# Patient Record
Sex: FEMALE | Race: WHITE | NOT HISPANIC OR LATINO | Employment: FULL TIME | URBAN - METROPOLITAN AREA
[De-identification: names, ages, dates, MRNs, and addresses within clinical notes are randomized per-mention and may not be internally consistent; named-entity substitution may affect disease eponyms.]

---

## 2018-08-20 ENCOUNTER — OFFICE VISIT CONVERTED (OUTPATIENT)
Dept: PULMONOLOGY | Facility: CLINIC | Age: 53
End: 2018-08-20
Attending: INTERNAL MEDICINE

## 2021-05-28 VITALS
SYSTOLIC BLOOD PRESSURE: 124 MMHG | RESPIRATION RATE: 16 BRPM | WEIGHT: 199.12 LBS | HEIGHT: 64 IN | TEMPERATURE: 98.5 F | DIASTOLIC BLOOD PRESSURE: 82 MMHG | HEART RATE: 78 BPM | BODY MASS INDEX: 33.99 KG/M2 | OXYGEN SATURATION: 96 %

## 2021-05-28 NOTE — PROGRESS NOTES
Patient: MARY KATE BUSH     Acct: [id number]     Report: #[id number]  UNIT #: [id number]     : 1965    Encounter Date:2018  PRIMARY CARE: LUIS MANUEL BEGUM  ***Signed***  --------------------------------------------------------------------------------------------------------------------  Chief Complaint      Encounter Date      Aug 20, 2018            Primary Care Provider      LUIS MANUEL BEGUM            Referring Provider      LUIS MANUEL BEGUM            Patient Complaint      Patient is complaining of      COPD/new patient            VITALS      Height 5 ft 4 in / 162.56 cm      Weight 199 lbs 2 oz / 90.668553 kg      BSA 2.06 m2      BMI 34.2 kg/m2      Temperature 98.5 F / 36.94 C - Oral      Pulse 78      Respirations 16      Blood Pressure 124/82 Sitting, Right Arm      Pulse Oximetry 96%, room air      Exhaled Nitrous Oxide Testin            HPI      The patient is a 53 year old female with chronic heavy smoking history in the     past who recently traveled in 2018 to her dying sister in [place].  At that     time she was very sick, was having shortness of breath and was congested.  She     was seen in [place] by a physician who gave her three doses of antibiotic shots    and an IV steroid shot was given, two different antibiotics and oral steroids to    go home with.  She came back to [place], but continued to have symptoms, so she    was admitted to the hospital at [facility] on 2018.  She     was started on breathing treatments, IV steroids and antibiotics including     ceftriaxone.  This helped her and she was discharged after three days. At that     time, CT scan of the chest was done which she was told was shown to have lung     nodules.  When she was discharged she was discharged on Spiriva, Brovana and     Pulmicort, but currently she is taking ProAir and Advair. She rarely uses ProAir    and has not used for one month now. She is on Advair which she is not  using     every day, uses only when she has symptoms.  She has a history of recurrent     pneumonia every year, especially when the weather changes.  She has been a heavy    smoker of one pack per day for more than 30 years. She quit smoking three weeks     ago, currently is using nicotine patch.  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 Her father was    a heavy smoker and had the diagnosis of emphysema and  of throat cancer. She    has been told she has COPD, but never had PFTs.  She has no fever, chills,     nausea or vomiting. She has shortness of breath with activities.            ROS      Constitutional:  Complains of: Fatigue; Denies: Fever, Weight gain, Weight loss,    Chills, Insomnia, Other      Respiratory/Breathing:  Complains of: Shortness of air, Cough; Denies: Wheezing,    Hemoptysis, Pleuritic pain, Other      Endocrine:  Denies: Polydipsia, Polyuria, Heat/cold intolerance, Abnorml menstru    al pattern, Diabetes, Other      Eyes:  Denies: Blurred vision, Vision Changes, Other      Cardiovascular:  Denies: Chest Pain, Exertional dyspnea, Peripheral Edema,     Palpitations, Syncope, Wake up Gasping for air, Orthopnea, Tachycardia, Other      Gastrointestinal:  Denies: Abdominal pain/cramping, Bloody stools, Constipation,    Diarrhea, Melena, Nausea, Vomiting, Other      Genitourinary:  Denies: Dysuria, Urinary frequency, Incontinence, Hematuria,     Urgency, Other      Musculoskeletal:  Denies: Joint Pain, Joint Stiffness, Joint Swelling, Myalgias,    Other      Hematologic/lymphatic:  DENIES: Lymphadenopathy, Bruising, Bleeding tendencies,     Other      Neurologic:  Denies: Headache, Numbness, Weakness, Seizures, Other      Psychiatric:  Denies: Anxiety, Appropriate Effect, Depression, Other      Sleep:  No: Excessive daytime sleep, Morning Headache?, Snoring, Insomnia?, Stop    breathing at sleep?, Other      Integumentary:  Denies:  Rash, Dry skin, Skin Warm to Touch, Other            FAMILY/SOCIAL/MEDICAL HX      Surgical History:  Yes: Cholecystectomy, Tonsils; No: AAA Repair, Abdominal Opal    lea, Adenoids, Angioplasty, Appendectomy, Back Surgery, Bladder Surgery, Bowel     Surgery, Breast Surgery, CABG, Carotid Stenosis, Ear Surgery, Eye Surgery, Head     Surgery, Hernia Surgery, Kidney Surgery, Nose Surgery, Oral Surgery, Orthopedic     Surgery, Prostatectomy, Rectal Surgery, Spinal Surgery, Testicular Surgery,     Throat Surgery, Valve Replacement, Vascular Surgery, Other Surgeries      Stroke - Family Hx:  Sister, Grandparent      Heart - Family Hx:  Mother, Father      Diabetes - Family Hx:  Mother, Sister      Cancer/Type - Family Hx:  Father, Sister, Grandparent      Is Father Still Living?:  No      Is Mother Still Living?:  No       Family History:  Yes      Social History:  Tobacco Use; No Alcohol Use, No Recreational Drug use      Smoking status:  Current every day smoker (1 ppd 30 years )      Hysterectomy:  Yes      Anticoagulation Therapy:  No      Antibiotic Prophylaxis:  No      Medical History:  Yes: Asthma, Miscellaneous Medical/oth (acid reflux); No:     Alcoholism, Allergies, Anemia, Arthritis, Atrial Fibrillation, Blood Disease,     Broken Bones, Cataracts, Chemical Dependency, Chemotherapy/Cancer, Chronic     Bronchitis/COPD, Emphysema, Chronic Liver Disease, Colon Trouble, Colitis,     Diverticulitis, Congestive Heart Failu, Deafness or Ringing Ears, Convulsions,     Depression, Anxiety, Bipolar Disorder, PTSD, Diabetes, Epilepsy, Seizures,     Forgetfullness, Glaucoma, Gall Stones, Gout, Head Injury, Heart Attack, Heart     Murmur, GERD, Hemorrhoids/Rectal Prob, Hepatitis, Hiatal Hernia, High Blood     Pressure, High Cholesterol, HIV (Do not ask - volu, Jaundice, Kidney or Bladder     Disease, Kidney Stones, Migrane Headaches, Mitral Valve Prolapse, Night sweats,     Phlebitis, Psychiatric Care, Reflux Disease,  Rheumatic Fever, Sexually     Transmitted Dis, Shortness Of Breath, Sinus Trouble, Skin Disease/Psoriais/Ecz,     Stroke, Thyroid Problem, Tuberculosis or Pos TB Te      Psychiatric History      none            PREVENTION      Hx Influenza Vaccination:  Yes      Date Influenza Vaccine Given:  Aug 1, 2017      Influenza Vaccine Declined:  No      2 or More Falls Past Year?:  No      Fall Past Year with Injury?:  No      Hx Pneumococcal Vaccination:  No      Encouraged to follow-up with:  PCP regarding preventative exams.      Chart initiated by      DANIELLE HERNANDEZ/ MA            ALLERGIES/MEDICATIONS      Allergies:        Coded Allergies:             Codeine (Verified  Allergy, 8/20/18)                  dizzy, nausous      Medications    Last Reconciled on 8/20/18 15:27 by AVELINO GARCIA MD      Albuterol (Proair HFA*) 8.5 Gm Inh      1-2 PUFFS INH RTQ6H Y for SHORTNESS OF BREATH, #1 INH 0 Refills         Reported         8/20/18       Neb-Budesonide (Pulmicort) 0.25 Mg/2 Ml Ampul.neb      0.25 MG INH RTBID, NEB         Reported         8/20/18       Arformoterol Tartrate (Brovana) 15 Mcg/2 Ml Vial.neb      15 MCG INH RTBID, #60 NEB         Reported         8/20/18       Magnesium Oxide (Magnesium Oxide) 500 Mg Tablet      250 MG PO QDAY, TAB         Reported         8/20/18       Omeprazole (PriLOSEC*) 40 Mg Capsule.dr      40 MG PO QDAY for 30 Days, #30 CAP 0 Refills         Reported         8/20/18      Current Medications      Current Medications Reviewed 8/20/18            EXAM      CONSTITUTIONAL: Pleasant  female in no acute distress, normal conversant.       EYES : Pink conjunctive, no ptosis, PERRL.       ENMT : Nose and ears appear normal, normal dentition, mild posterior pharyngeal     wall erythema, no sinus tenderness. Mallampati classification 2.        Neck: Nontender, no masses, no thyromegaly, no nodules.      Resp : Bilateral diminished breath sounds, no wheezing, crackles or rhonchi.      Resonant  to percussion bilaterally.      CVS  : No carotid bruits, s1s2 nl, RRR, no murmur, rubs or gallop, no peripheral    edema       Chest wall: Normal rise with inspiration, nontender on palpation.      GI   : Abdomen soft, with no masses, no hepatosplenomegaly, no hernias, BS+      MSK  : Normal gait and station, no digital cyanosis or clubbing       Skin : No rashes, ulcerations or lesions, normal turgor and temperature      Neuro: CN II - XII intact, no sensory deficits, DTRs intact and symmetrical, no     motor weakness      Psych: Appropriate affect, A   Vitals      Vitals:             Height 5 ft 4 in / 162.56 cm           Weight 199 lbs 2 oz / 90.864186 kg           BSA 2.06 m2           BMI 34.2 kg/m2           Temperature 98.5 F / 36.94 C - Oral           Pulse 78           Respirations 16           Blood Pressure 124/82 Sitting, Right Arm           Pulse Oximetry 96%, room air            REVIEW      Results Reviewed      PCCS Results Reviewed?:  Yes Prev Lab Results, Yes Prev Radiology Results, Yes     Previous [facility] Records      Lab Results      The patient's primary care office visit note was reviewed.  We are calling for     records of CT scan of the chest done at [facility] on     05/14/2018.            Assessment      COPD (chronic obstructive pulmonary disease)         Centrilobular emphysema - J43.2         COPD type: emphysema         Emphysema type: centrilobular            Notes      New Medications      * Omeprazole (PriLOSEC*) 40 MG CAPSULE.DR: 40 MG PO QDAY 30 Days #30      * MAGNESIUM OXIDE (Magnesium Oxide) 500 MG TABLET: 250 MG PO QDAY      * ARFORMOTEROL TARTRATE (Brovana) 15 MCG/2 ML VIAL.NEB: 15 MCG INH RTBID #60         Instructions: DIAGNOSIS CODE REQUIRED PRIOR TO PRESCRIBING.      * Neb-Budesonide (Pulmicort) 0.25 MG/2 ML AMPUL.NEB: 0.25 MG INH RTBID         Instructions: DIAGNOSIS CODE REQUIRED PRIOR TO PRESCRIBING.      * ALBUTEROL (Proair HFA*) 8.5 GM INH: 1-2 PUFFS  INH RTQ6H PRN SHORTNESS OF       BREATH #1      * Umeclidinium/Vilanterol 62.5-25 Mcg Inh (Anoro Ellipta 62.5-25 Mcg Inh) 1 EACH      BLST.W.DEV: 1 PUFF INH QDAY #1      New Diagnostics      * PFT-Comp, PrePost,DLCO,BodyBox, Week         Dx: COPD (chronic obstructive pulmonary disease) - J44.9      * 6 Min Walk With Pulse Ox, Routine         Dx: COPD (chronic obstructive pulmonary disease) - J44.9      * Immunoglobulin  E (I, Week         Dx: COPD (chronic obstructive pulmonary disease) - J44.9      PLAN:   The patient is a 53 year old female with a chronic smoking history who     likely has COPD with recent COPD exacerbation.            1. COPD with recent COPD exacerbation.  I will check pulmonary function test,     six minute walk test and serum IgE level. I advised her to continue with ProAir     as needed, stop Advair, start Anoro once daily.              2.  I applauded her effort to quit smoking.  Counseling was done for more than     five minutes.  I advised her to continue with nicotine patch for now.              3. We will address vaccination and further choice of inhalers on next office     visit.            4. Apparently she has a history of lung nodules which was seen on CT scan of     05/2017.  If we do not get the records of that, after next office visit, I will     order a CT scan of the chest.            Patient Education      Education resources provided:  Yes      Patient Education Provided:  COPD            Patient Education:        Chronic Obstructive Pulmonary Disease                 Disclaimer: Converted document may not contain table formatting or lab diagrams. Please see [facility] [facility] for the authenticated document.

## 2022-04-14 ENCOUNTER — OFFICE VISIT (OUTPATIENT)
Dept: CARDIOLOGY | Facility: CLINIC | Age: 57
End: 2022-04-14

## 2022-04-14 VITALS
WEIGHT: 197 LBS | BODY MASS INDEX: 33.63 KG/M2 | HEIGHT: 64 IN | DIASTOLIC BLOOD PRESSURE: 69 MMHG | SYSTOLIC BLOOD PRESSURE: 132 MMHG | HEART RATE: 84 BPM

## 2022-04-14 DIAGNOSIS — I50.32 DIASTOLIC CHF, CHRONIC: ICD-10-CM

## 2022-04-14 DIAGNOSIS — R06.02 SHORTNESS OF BREATH: Primary | ICD-10-CM

## 2022-04-14 DIAGNOSIS — Z72.0 NICOTINE USE: ICD-10-CM

## 2022-04-14 PROCEDURE — 93000 ELECTROCARDIOGRAM COMPLETE: CPT | Performed by: SPECIALIST

## 2022-04-14 PROCEDURE — 99203 OFFICE O/P NEW LOW 30 MIN: CPT | Performed by: SPECIALIST

## 2022-04-14 RX ORDER — HYDROCHLOROTHIAZIDE 12.5 MG/1
12.5 TABLET ORAL DAILY
COMMUNITY
Start: 2022-03-23

## 2022-04-14 RX ORDER — CLOPIDOGREL BISULFATE 75 MG/1
75 TABLET ORAL DAILY
COMMUNITY
Start: 2022-03-23

## 2022-04-14 NOTE — PROGRESS NOTES
[facility]  Cardiology progress Note    Patient Name: Lisy Almeida  : 1965    CHIEF COMPLAINT  Shortness of breath      Subjective   Subjective     HISTORY OF PRESENT ILLNESS    Lisy Almeida is a 56 y.o. female with history of shortness of breath on exertion for several years.  Increased recently.  She has chronic COPD.  No chest pain.  No history of CHF on chest x-ray.    Review of Systems:   Constitutional no fever,  no weight loss   Skin no rash   Otolaryngeal no difficulty swallowing   Cardiovascular See HPI   Pulmonary no cough, no sputum production   Gastrointestinal no constipation, no diarrhea   Genitourinary no dysuria, no hematuria   Hematologic no easy bruisability, no abnormal bleeding   Musculoskeletal no muscle pain   Neurologic no dizziness, no falls         Personal History     Social History:  reports that she has been smoking. She has never used smokeless tobacco. She reports that she does not drink alcohol and does not use drugs.    Home Medications:  Current Outpatient Medications on File Prior to Visit   Medication Sig   • Albuterol Sulfate (PROAIR HFA IN) Inhale.   • Aspirin 81 MG capsule TAKE 1 TABLET BY MOUTH ONCE DAILY   • Budeson-Glycopyrrol-Formoterol (BREZTRI AEROSPHERE IN) Inhale.   • clopidogrel (PLAVIX) 75 MG tablet Take 75 mg by mouth Daily.   • hydroCHLOROthiazide (HYDRODIURIL) 12.5 MG tablet Take 12.5 mg by mouth Daily.   • Tiotropium Bromide Monohydrate (SPIRIVA HANDIHALER IN) Inhale.     No current facility-administered medications on file prior to visit.     Allergies:  Allergies   Allergen Reactions   • Codeine Shortness Of Breath       Objective    Objective       Vitals:   Heart Rate:  [84] 84  BP: (132)/(69) 132/69  Body mass index is 33.81 kg/m².     Physical Exam:   Constitutional: Awake, alert, No acute distress    Eyes: PERRLA, sclerae anicteric, no conjunctival injection   HENT: NCAT, mucous membranes moist   Neck: Supple, no thyromegaly, no  lymphadenopathy, trachea midline   Respiratory: Clear to auscultation bilaterally, nonlabored respirations    Cardiovascular: RRR, no murmurs or rubs. Palpable pedal pulses bilaterally   Musculoskeletal: No bilateral ankle edema, no cyanosis to extremities   Psychiatric: Appropriate affect, cooperative   Neurologic: Oriented x 3, strength symmetric in all extremities, Cranial Nerves grossly intact to confrontation, speech clear   Skin: No rashes.    Result Review    Result Review:  I have personally reviewed the available results from  [x]  Laboratory  [x]  EKG  [x]  Cardiology  [x]  Medications  [x]  Old records  []  Other:     ECG 12 Lead    Date/Time: 4/14/2022 12:41 PM  Performed by: Otto Stevens MD  Authorized by: Otto Stevens MD   Rhythm: sinus rhythm  Conduction: incomplete right bundle branch block  Other findings: non-specific ST-T wave changes    Clinical impression: abnormal EKG            Impression/Plan:  1.  Chronic diastolic heart failure/shortness of breath: Continue hydrochlorothiazide 12.5 mg once a day.  Increased to 25 mg once a day if still short of breath.  Echocardiogram to evaluate left ventricular systolic function.  Check BMP and BNP.  2.  COPD: Continue current bronchodilators.  3.  TIA: Continue Plavix 75 mg a day.  4.  Positive nicotine use: Smoking cessation discussed with patient.           Otto Stevens MD   04/14/22   12:27 EDT

## 2022-04-19 ENCOUNTER — TELEPHONE (OUTPATIENT)
Dept: CARDIOLOGY | Facility: CLINIC | Age: 57
End: 2022-04-19

## 2022-04-19 NOTE — TELEPHONE ENCOUNTER
----- Message from DEBORAH Rubi sent at 4/14/2022  3:35 PM EDT -----  Notify pt potassium level is low, start potassium 10 meq daily, recheck BMP in 2 weeks

## 2022-06-20 ENCOUNTER — TELEPHONE (OUTPATIENT)
Dept: CARDIOLOGY | Facility: CLINIC | Age: 57
End: 2022-06-20

## 2022-06-20 NOTE — TELEPHONE ENCOUNTER
----- Message from Otto Stevens MD sent at 6/20/2022  2:06 PM EDT -----  Notify pt echocardiogram shows normal heart function and no significant valve abnormality. Keep follow up as scheduled.

## 2022-07-28 ENCOUNTER — OFFICE VISIT (OUTPATIENT)
Dept: PULMONOLOGY | Facility: CLINIC | Age: 57
End: 2022-07-28

## 2022-07-28 VITALS
HEIGHT: 64 IN | TEMPERATURE: 98.2 F | OXYGEN SATURATION: 95 % | BODY MASS INDEX: 32.78 KG/M2 | WEIGHT: 192 LBS | HEART RATE: 88 BPM | RESPIRATION RATE: 19 BRPM | DIASTOLIC BLOOD PRESSURE: 83 MMHG | SYSTOLIC BLOOD PRESSURE: 130 MMHG

## 2022-07-28 DIAGNOSIS — J41.8 MIXED SIMPLE AND MUCOPURULENT CHRONIC BRONCHITIS: Primary | ICD-10-CM

## 2022-07-28 DIAGNOSIS — Z72.0 TOBACCO ABUSE: ICD-10-CM

## 2022-07-28 PROCEDURE — 99203 OFFICE O/P NEW LOW 30 MIN: CPT | Performed by: INTERNAL MEDICINE

## 2022-07-28 RX ORDER — SULFAMETHOXAZOLE AND TRIMETHOPRIM 800; 160 MG/1; MG/1
TABLET ORAL
COMMUNITY
Start: 2022-06-22

## 2022-07-28 RX ORDER — ROFLUMILAST 250 UG/1
TABLET ORAL DAILY
COMMUNITY

## 2022-07-28 RX ORDER — FEXOFENADINE HCL 180 MG/1
180 TABLET ORAL DAILY
COMMUNITY
Start: 2022-06-28

## 2022-07-28 NOTE — PROGRESS NOTES
Pulmonary Consultation    Gina Murcia*,    Thank you for asking me to see Lisy Almeida for   Chief Complaint   Patient presents with   • Establish Care     New Patient    • COPD   • Shortness of Breath   • Cough   • Wheezing   .      History of Present Illness  Lisy Almeida is a 56 y.o. female with a PMH significant for COPD and tobacco abuse presents for evaluation patient complains of dyspnea on exertion along with cough wheeze and mucopurulent sputum sputum off and on patient complains of tiredness and fatigue and reduced effort tolerance she denies any chest pain fever or hemoptysis patient continues to smoke but is trying to cut back       Tobacco use history:  Type: cigarettes  Amount: 0.5 ppd  Duration: 40 years  Cessation: n   Willing to quit: Yes      Review of Systems: History obtained from chart review and the patient.  Review of Systems   Respiratory: Positive for cough, shortness of breath and wheezing.    All other systems reviewed and are negative.    As described in the HPI. Otherwise, remainder of ROS (14 systems) were negative.    There is no problem list on file for this patient.        Current Outpatient Medications:   •  Albuterol Sulfate (PROAIR HFA IN), Inhale., Disp: , Rfl:   •  Aspirin 81 MG capsule, TAKE 1 TABLET BY MOUTH ONCE DAILY, Disp: , Rfl:   •  Budeson-Glycopyrrol-Formoterol (BREZTRI AEROSPHERE IN), Inhale., Disp: , Rfl:   •  clopidogrel (PLAVIX) 75 MG tablet, Take 75 mg by mouth Daily., Disp: , Rfl:   •  fexofenadine (ALLEGRA) 180 MG tablet, Take 180 mg by mouth Daily., Disp: , Rfl:   •  hydroCHLOROthiazide (HYDRODIURIL) 12.5 MG tablet, Take 12.5 mg by mouth Daily., Disp: , Rfl:   •  MAGNESIUM PO, Take  by mouth., Disp: , Rfl:   •  roflumilast (Daliresp) 250 MCG tablet tablet, Take  by mouth Daily., Disp: , Rfl:   •  sulfamethoxazole-trimethoprim (BACTRIM DS,SEPTRA DS) 800-160 MG per tablet, TAKE 1 TABLET BY MOUTH DAILY FOR REFRACTORY COPD, Disp: , Rfl:   •   "Tiotropium Bromide Monohydrate (SPIRIVA HANDIHALER IN), Inhale., Disp: , Rfl:     Allergies   Allergen Reactions   • Codeine Shortness Of Breath       Past Medical History:   Diagnosis Date   • Asthma    • COPD (chronic obstructive pulmonary disease) ([facility])      History reviewed. No pertinent surgical history.  Social History     Socioeconomic History   • Marital status: Single   Tobacco Use   • Smoking status: Current Every Day Smoker     Types: Cigarettes   • Smokeless tobacco: Never Used   • Tobacco comment: 4-5 cigs daily   Vaping Use   • Vaping Use: Never used   Substance and Sexual Activity   • Alcohol use: Never   • Drug use: Never   • Sexual activity: Defer     Family History   Problem Relation Age of Onset   • Heart disease Mother           Objective     Blood pressure 130/83, pulse 88, temperature 98.2 °F (36.8 °C), temperature source Temporal, resp. rate 19, height 162.6 cm (64\"), weight 87.1 kg (192 lb), SpO2 95 %.  Physical Exam  Vitals and nursing note reviewed.   Constitutional:       Appearance: Normal appearance.   HENT:      Head: Normocephalic and atraumatic.      Nose: Nose normal.      Mouth/Throat:      Mouth: Mucous membranes are moist.      Pharynx: Oropharynx is clear.   Eyes:      Extraocular Movements: Extraocular movements intact.      Conjunctiva/sclera: Conjunctivae normal.      Pupils: Pupils are equal, round, and reactive to light.   Cardiovascular:      Rate and Rhythm: Normal rate and regular rhythm.      Pulses: Normal pulses.      Heart sounds: Normal heart sounds.   Pulmonary:      Effort: Pulmonary effort is normal.      Breath sounds: Rhonchi present.   Abdominal:      General: Abdomen is flat. Bowel sounds are normal.      Palpations: Abdomen is soft.   Musculoskeletal:         General: Normal range of motion.      Cervical back: Normal range of motion and neck supple.   Skin:     General: Skin is warm.      Capillary Refill: Capillary refill takes 2 to 3 seconds.      " Comments: Bruising noted on the arms   Neurological:      General: No focal deficit present.      Mental Status: She is alert and oriented to person, place, and time.   Psychiatric:         Mood and Affect: Mood normal.         Behavior: Behavior normal.       Immunization History   Administered Date(s) Administered   • COVID-19 (MODERNA) 1st, 2nd, 3rd Dose Only 04/23/2021, 05/21/2021   • COVID-19 (MODERNA) BOOSTER 10/29/2021   • Hepatitis A 09/09/2018, 03/08/2019            Assessment & Plan     Diagnoses and all orders for this visit:    1. Mixed simple and mucopurulent chronic bronchitis (HCC) (Primary)    2. Tobacco abuse         Discussion/ Recommendations:   Patient was strongly advised to stop smoking  We will order chest x-ray pulmonary function test and alpha-1 antitrypsin level for evaluation  Patient is advised breast tree 2 puffs twice daily  Albuterol inhaler 2 puffs every 6 as needed  Patient is advised to discontinue her Spiriva along with Atrovent  Patient is advised to reduce weight  Discussed vaccination and recommended    BMI is >= 30 and <35. (Class 1 Obesity). The following options were offered after discussion;: nutrition counseling/recommendations           Return in about 3 months (around 10/28/2022).      Thank you for allowing me to participate in the care of Lisy Almeida. Please do not hesitate to contact me with any questions.         This document has been electronically signed by Marcos Brown MD on July 28, 2022 15:12 EDT

## 2022-08-03 ENCOUNTER — HOSPITAL ENCOUNTER (OUTPATIENT)
Dept: GENERAL RADIOLOGY | Facility: HOSPITAL | Age: 57
Discharge: HOME | End: 2022-08-03
Admitting: INTERNAL MEDICINE

## 2022-08-03 DIAGNOSIS — J41.8 MIXED SIMPLE AND MUCOPURULENT CHRONIC BRONCHITIS: ICD-10-CM

## 2022-08-03 DIAGNOSIS — Z72.0 TOBACCO ABUSE: ICD-10-CM

## 2022-08-03 PROCEDURE — 71046 X-RAY EXAM CHEST 2 VIEWS: CPT

## 2022-08-04 ENCOUNTER — TELEPHONE (OUTPATIENT)
Dept: CARDIOLOGY | Facility: CLINIC | Age: 57
End: 2022-08-04

## 2022-08-04 NOTE — TELEPHONE ENCOUNTER
I tried to call patient regarding overdue labs. She was unavailable so I left a message with call back number.

## 2022-12-12 ENCOUNTER — TELEPHONE (OUTPATIENT)
Dept: PULMONOLOGY | Facility: CLINIC | Age: 57
End: 2022-12-12

## 2022-12-12 ENCOUNTER — HOSPITAL ENCOUNTER (OUTPATIENT)
Dept: RESPIRATORY THERAPY | Facility: HOSPITAL | Age: 57
Discharge: HOME | End: 2022-12-12
Admitting: INTERNAL MEDICINE

## 2022-12-12 DIAGNOSIS — Z72.0 TOBACCO ABUSE: ICD-10-CM

## 2022-12-12 DIAGNOSIS — J41.8 MIXED SIMPLE AND MUCOPURULENT CHRONIC BRONCHITIS: ICD-10-CM

## 2022-12-12 PROCEDURE — 94060 EVALUATION OF WHEEZING: CPT | Performed by: INTERNAL MEDICINE

## 2022-12-12 PROCEDURE — 94729 DIFFUSING CAPACITY: CPT | Performed by: INTERNAL MEDICINE

## 2022-12-12 PROCEDURE — 94729 DIFFUSING CAPACITY: CPT

## 2022-12-12 PROCEDURE — 94726 PLETHYSMOGRAPHY LUNG VOLUMES: CPT | Performed by: INTERNAL MEDICINE

## 2022-12-12 PROCEDURE — 94060 EVALUATION OF WHEEZING: CPT

## 2022-12-12 PROCEDURE — 94726 PLETHYSMOGRAPHY LUNG VOLUMES: CPT

## 2022-12-12 RX ORDER — ALBUTEROL SULFATE 2.5 MG/3ML
2.5 SOLUTION RESPIRATORY (INHALATION) ONCE
Status: COMPLETED | OUTPATIENT
Start: 2022-12-12 | End: 2022-12-12

## 2022-12-12 RX ADMIN — ALBUTEROL SULFATE 2.5 MG: 2.5 SOLUTION RESPIRATORY (INHALATION) at 08:33

## 2022-12-12 NOTE — TELEPHONE ENCOUNTER
Patient called this morning in regards to her PFT she just had done today. I scheduled her for a follow up to go over results but next available was not until middle of January. Patient would like to be contacted with results if they come back before her scheduled follow up. Please advise, thank you.

## 2023-04-19 ENCOUNTER — OFFICE VISIT (OUTPATIENT)
Dept: PULMONOLOGY | Facility: CLINIC | Age: 58
End: 2023-04-19
Payer: COMMERCIAL

## 2023-04-19 VITALS
TEMPERATURE: 98.7 F | BODY MASS INDEX: 34.21 KG/M2 | HEART RATE: 85 BPM | OXYGEN SATURATION: 95 % | WEIGHT: 200.4 LBS | DIASTOLIC BLOOD PRESSURE: 87 MMHG | HEIGHT: 64 IN | RESPIRATION RATE: 16 BRPM | SYSTOLIC BLOOD PRESSURE: 144 MMHG

## 2023-04-19 DIAGNOSIS — J44.1 COPD WITH ACUTE EXACERBATION: Primary | ICD-10-CM

## 2023-04-19 DIAGNOSIS — Z72.0 TOBACCO ABUSE: ICD-10-CM

## 2023-04-19 PROCEDURE — 99214 OFFICE O/P EST MOD 30 MIN: CPT | Performed by: INTERNAL MEDICINE

## 2023-04-19 RX ORDER — AMOXICILLIN 500 MG/1
500 CAPSULE ORAL 3 TIMES DAILY
Qty: 21 CAPSULE | Refills: 0 | Status: SHIPPED | OUTPATIENT
Start: 2023-04-19 | End: 2023-04-26

## 2023-04-19 RX ORDER — TIOTROPIUM BROMIDE AND OLODATEROL 3.124; 2.736 UG/1; UG/1
2 SPRAY, METERED RESPIRATORY (INHALATION)
Qty: 2 EACH | Refills: 0 | COMMUNITY
Start: 2023-04-19 | End: 2023-04-20

## 2023-04-19 RX ORDER — TIOTROPIUM BROMIDE AND OLODATEROL 3.124; 2.736 UG/1; UG/1
2 SPRAY, METERED RESPIRATORY (INHALATION)
Qty: 1 EACH | Refills: 11 | Status: SHIPPED | OUTPATIENT
Start: 2023-04-19

## 2023-04-19 RX ORDER — PREDNISONE 20 MG/1
40 TABLET ORAL DAILY
Qty: 14 TABLET | Refills: 0 | Status: SHIPPED | OUTPATIENT
Start: 2023-04-19

## 2023-04-19 RX ORDER — ROSUVASTATIN CALCIUM 10 MG/1
TABLET, COATED ORAL
COMMUNITY
Start: 2023-02-10

## 2023-04-19 NOTE — PROGRESS NOTES
Pulmonary Office Follow-up    Subjective     Lisy Almeida is seen today at the office for   Chief Complaint   Patient presents with   • Bronchitis   • Follow-up     3 month fu   • Results     pft   • Wheezing   • Shortness of Breath   • Cough         HPI  Lisy Almeida is a 57 y.o. female with a PMH significant for tobacco abuse and COPD presents with worsening breathlessness patient has been having cough with wheeze and yellowish mucopurulent sputum she complains of chest congestion with discomfort patient denies any fever or hemoptysis she is trying to cut back on her smoking  Patient was unable to tolerate Breztri because of recurrent thrush      Tobacco use history:  Type: cigarettes  Amount: 0.5 ppd  Duration: 30 years  Cessation: n   Willing to quit: Yes      There is no problem list on file for this patient.      Review of Systems  Review of Systems   Respiratory: Positive for cough, shortness of breath and wheezing.    All other systems reviewed and are negative.    As described in the HPI. Otherwise, remainder of ROS (14 systems) were negative.    Medications, Allergies, Social, and Family Histories reviewed as per EMR.    Objective     Vitals:    04/19/23 1420   BP: 144/87   Pulse: 85   Resp: 16   Temp: 98.7 °F (37.1 °C)   SpO2: 95%         04/19/23  1420   Weight: 90.9 kg (200 lb 6.4 oz)       Physical Exam  Vitals and nursing note reviewed.   Constitutional:       Appearance: She is obese.   HENT:      Head: Normocephalic and atraumatic.      Nose: Nose normal.      Mouth/Throat:      Mouth: Mucous membranes are moist.   Eyes:      Conjunctiva/sclera: Conjunctivae normal.      Pupils: Pupils are equal, round, and reactive to light.   Cardiovascular:      Rate and Rhythm: Normal rate and regular rhythm.      Pulses: Normal pulses.      Heart sounds: Normal heart sounds.   Pulmonary:      Effort: Pulmonary effort is normal.      Breath sounds: Wheezing and rhonchi present.   Abdominal:       General: Abdomen is flat. Bowel sounds are normal.      Palpations: Abdomen is soft.   Musculoskeletal:         General: Normal range of motion.      Cervical back: Normal range of motion and neck supple.   Skin:     General: Skin is warm.      Capillary Refill: Capillary refill takes less than 2 seconds.   Neurological:      General: No focal deficit present.      Mental Status: She is alert and oriented to person, place, and time.   Psychiatric:         Mood and Affect: Mood normal.         No radiology results for the last 90 days.     Assessment & Plan     Diagnoses and all orders for this visit:    1. COPD with acute exacerbation (Primary)    2. Tobacco abuse    Other orders  -     amoxicillin (AMOXIL) 500 MG capsule; Take 1 capsule by mouth 3 (Three) Times a Day for 7 days.  Dispense: 21 capsule; Refill: 0  -     predniSONE (DELTASONE) 20 MG tablet; Take 2 tablets by mouth Daily.  Dispense: 14 tablet; Refill: 0  -     tiotropium bromide-olodaterol (Stiolto Respimat) 2.5-2.5 MCG/ACT aerosol solution inhaler; Inhale 2 puffs Daily.  Dispense: 1 each; Refill: 11         Discussion/ Recommendations:   Patient is advised to stop smoking and she is going to work on it  Patient is advised Stiolto twice daily and discontinue Breztri  Albuterol inhaler 2 puffs every 6 hours as needed  Amoxil and prednisone for 1 week for COPD exacerbation  Patient is advised to reduce weight her BMI is 34.40  Vaccinations discussed and recommended    BMI is >= 30 and <35. (Class 1 Obesity). The following options were offered after discussion;: exercise counseling/recommendations        Return in about 3 months (around 7/19/2023).          This document has been electronically signed by Marcos Brown MD on April 19, 2023 14:27 EDT

## 2023-08-03 ENCOUNTER — OFFICE VISIT (OUTPATIENT)
Dept: PULMONOLOGY | Facility: CLINIC | Age: 58
End: 2023-08-03
Payer: COMMERCIAL

## 2023-08-03 VITALS
HEIGHT: 64 IN | TEMPERATURE: 98.4 F | OXYGEN SATURATION: 96 % | WEIGHT: 206.4 LBS | BODY MASS INDEX: 35.24 KG/M2 | DIASTOLIC BLOOD PRESSURE: 82 MMHG | SYSTOLIC BLOOD PRESSURE: 137 MMHG | RESPIRATION RATE: 18 BRPM | HEART RATE: 84 BPM

## 2023-08-03 DIAGNOSIS — Z72.0 TOBACCO ABUSE: ICD-10-CM

## 2023-08-03 DIAGNOSIS — R49.0 HOARSENESS OF VOICE: ICD-10-CM

## 2023-08-03 DIAGNOSIS — J44.1 COPD WITH ACUTE EXACERBATION: Primary | ICD-10-CM

## 2023-08-03 PROCEDURE — 99214 OFFICE O/P EST MOD 30 MIN: CPT | Performed by: INTERNAL MEDICINE

## 2023-08-03 RX ORDER — MELATONIN
1000 DAILY
COMMUNITY

## 2023-08-03 RX ORDER — MULTIVIT WITH MINERALS/LUTEIN
500 TABLET ORAL DAILY
COMMUNITY

## 2023-08-03 RX ORDER — HYDROCODONE BITARTRATE AND ACETAMINOPHEN 5; 325 MG/1; MG/1
TABLET ORAL
COMMUNITY
Start: 2023-06-16

## 2023-08-03 RX ORDER — NYSTATIN 100000 [USP'U]/G
POWDER TOPICAL
COMMUNITY
Start: 2023-06-22

## 2023-08-03 RX ORDER — ALBUTEROL SULFATE 90 UG/1
2 AEROSOL, METERED RESPIRATORY (INHALATION) EVERY 4 HOURS PRN
COMMUNITY
Start: 2023-06-22

## 2023-08-03 RX ORDER — ALBUTEROL SULFATE 2.5 MG/3ML
2.5 SOLUTION RESPIRATORY (INHALATION) EVERY 4 HOURS PRN
COMMUNITY
Start: 2023-07-14

## 2023-08-03 RX ORDER — BENZONATATE 200 MG/1
200 CAPSULE ORAL
COMMUNITY
Start: 2023-06-22

## 2023-08-03 RX ORDER — ROFLUMILAST 500 UG/1
500 TABLET ORAL DAILY
Qty: 30 TABLET | Refills: 11 | Status: SHIPPED | OUTPATIENT
Start: 2023-08-03

## 2023-08-28 ENCOUNTER — HOSPITAL ENCOUNTER (OUTPATIENT)
Dept: CT IMAGING | Facility: HOSPITAL | Age: 58
Discharge: HOME | End: 2023-08-28
Admitting: INTERNAL MEDICINE
Payer: COMMERCIAL

## 2023-08-28 DIAGNOSIS — Z72.0 TOBACCO ABUSE: ICD-10-CM

## 2023-08-28 DIAGNOSIS — J44.1 COPD WITH ACUTE EXACERBATION: ICD-10-CM

## 2023-08-28 DIAGNOSIS — R49.0 HOARSENESS OF VOICE: ICD-10-CM

## 2023-08-28 LAB
CREAT BLDA-MCNC: 1 MG/DL
EGFRCR SERPLBLD CKD-EPI 2021: 64.6 ML/MIN/1.73

## 2023-08-28 PROCEDURE — 25510000001 IOPAMIDOL PER 1 ML: Performed by: INTERNAL MEDICINE

## 2023-08-28 PROCEDURE — 71260 CT THORAX DX C+: CPT

## 2023-08-28 PROCEDURE — 82565 ASSAY OF CREATININE: CPT

## 2023-08-28 RX ADMIN — IOPAMIDOL 100 ML: 755 INJECTION, SOLUTION INTRAVENOUS at 14:28

## 2023-09-06 ENCOUNTER — OFFICE VISIT (OUTPATIENT)
Dept: PULMONOLOGY | Facility: CLINIC | Age: 58
End: 2023-09-06
Payer: COMMERCIAL

## 2023-09-06 VITALS
SYSTOLIC BLOOD PRESSURE: 136 MMHG | DIASTOLIC BLOOD PRESSURE: 87 MMHG | RESPIRATION RATE: 19 BRPM | HEART RATE: 85 BPM | HEIGHT: 64 IN | TEMPERATURE: 98.2 F | OXYGEN SATURATION: 94 % | WEIGHT: 200 LBS | BODY MASS INDEX: 34.15 KG/M2

## 2023-09-06 DIAGNOSIS — J44.1 COPD WITH ACUTE EXACERBATION: Primary | ICD-10-CM

## 2023-09-06 DIAGNOSIS — Z72.0 TOBACCO ABUSE: ICD-10-CM

## 2023-09-06 PROCEDURE — 99214 OFFICE O/P EST MOD 30 MIN: CPT | Performed by: INTERNAL MEDICINE

## 2023-09-06 RX ORDER — AZITHROMYCIN 250 MG/1
TABLET, FILM COATED ORAL
Qty: 6 TABLET | Refills: 0 | Status: SHIPPED | OUTPATIENT
Start: 2023-09-06

## 2023-09-06 RX ORDER — PREDNISONE 20 MG/1
40 TABLET ORAL DAILY
Qty: 14 TABLET | Refills: 0 | Status: SHIPPED | OUTPATIENT
Start: 2023-09-06

## 2023-09-06 NOTE — PROGRESS NOTES
Pulmonary Office Follow-up    Subjective     Lisy Almeida is seen today at the office for   Chief Complaint   Patient presents with    COPD    Follow-up     1 Month/ Ct results          HPI  Lisy Almeida is a 58 y.o. female with a PMH significant for COPD and tobacco abuse presents for follow-up patient complains of cough with wheeze and mucopurulent expectoration patient has been having a lot of nasal drainage along with sinus congestion also recently she denies any chest pain fever or hemoptysis patient is seems to be doing better with the medications and is cutting back on her smoking      Tobacco use history:        There is no problem list on file for this patient.      Review of Systems  Review of Systems   HENT:  Positive for postnasal drip and rhinorrhea.    Respiratory:  Positive for cough and shortness of breath.    All other systems reviewed and are negative.  As described in the HPI. Otherwise, remainder of ROS (14 systems) were negative.    Medications, Allergies, Social, and Family Histories reviewed as per EMR.    Objective     Vitals:    09/06/23 1548   BP: 136/87   Pulse: 85   Resp: 19   Temp: 98.2 °F (36.8 °C)   SpO2: 94%         09/06/23  1548   Weight: 90.7 kg (200 lb)       Physical Exam  Vitals and nursing note reviewed.   Constitutional:       Appearance: Normal appearance. She is obese.   HENT:      Head: Normocephalic and atraumatic.      Nose: Congestion and rhinorrhea present.      Mouth/Throat:      Mouth: Mucous membranes are moist.      Pharynx: Oropharynx is clear.   Eyes:      Extraocular Movements: Extraocular movements intact.      Conjunctiva/sclera: Conjunctivae normal.      Pupils: Pupils are equal, round, and reactive to light.   Cardiovascular:      Rate and Rhythm: Normal rate and regular rhythm.      Pulses: Normal pulses.      Heart sounds: Normal heart sounds.   Pulmonary:      Breath sounds: Wheezing and rhonchi present.   Abdominal:      General: Abdomen is  flat. Bowel sounds are normal.      Palpations: Abdomen is soft.   Musculoskeletal:         General: Normal range of motion.      Cervical back: Normal range of motion and neck supple.   Skin:     General: Skin is warm.      Capillary Refill: Capillary refill takes 2 to 3 seconds.   Neurological:      General: No focal deficit present.      Mental Status: She is alert and oriented to person, place, and time.   Psychiatric:         Mood and Affect: Mood normal.         Behavior: Behavior normal.       CT Chest With Contrast Diagnostic    Result Date: 8/29/2023    1. Upper lobe predominant ground-glass centrilobular nodules, likely representing smoking-related respiratory bronchiolitis. 2. No abnormal bronchial wall thickening, bronchiectasis, or acute consolidation.      VIKTORIYA GONCALVES MD       Electronically Signed and Approved By: VIKTORIYA GONCALVES MD on 8/29/2023 at 12:42               Assessment & Plan     Diagnoses and all orders for this visit:    1. COPD with acute exacerbation (Primary)    2. Tobacco abuse    Other orders  -     azithromycin (ZITHROMAX) 250 MG tablet; Take 2 by mouth today then 1 daily for 4 days  Dispense: 6 tablet; Refill: 0  -     predniSONE (DELTASONE) 20 MG tablet; Take 2 tablets by mouth Daily.  Dispense: 14 tablet; Refill: 0         Discussion/ Recommendations:   Patient is continued to advise to stop smoking  Continue Stiolto along with albuterol as needed  Advised to reduce weight her BMI is 34.33  We will start her on Zithromax and prednisone for COPD exacerbation  CT scan suggestive of respiratory bronchiolitis  Vaccinations discussed and recommended             Return in about 3 months (around 12/6/2023).          This document has been electronically signed by Marcos Brown MD on September 6, 2023 15:51 EDT

## 2023-11-07 ENCOUNTER — TELEPHONE (OUTPATIENT)
Dept: PULMONOLOGY | Facility: CLINIC | Age: 58
End: 2023-11-07

## 2023-11-07 NOTE — TELEPHONE ENCOUNTER
Caller: Lisy Almeida    Relationship to patient: Self    Best call back number: [phone]    Chief complaint: PT RECEIVED CALL TO RESCHEDULED 12/6 APPT NEEDS FIRST AVAILABLE    Type of visit: FOLLOW UP    Requested date: 1ST AVAILABLE     If rescheduling, when is the original appointment: 12/6     Additional notes:PT IS STILL RECOVERING FROM RSV AND NEEDS TO BE SEEN ASAP.  ASKED IF WE HAD ANYTHING THE LAST WEEK OF DECEMBER . SO THAT SHE WOULD NOT HAVE TO MISS WORK. PT STATED OK TO LEAVE VOICE MAIL.

## 2023-12-26 ENCOUNTER — HOSPITAL ENCOUNTER (OUTPATIENT)
Dept: GENERAL RADIOLOGY | Facility: HOSPITAL | Age: 58
Discharge: HOME | End: 2023-12-26
Admitting: INTERNAL MEDICINE
Payer: COMMERCIAL

## 2023-12-26 ENCOUNTER — OFFICE VISIT (OUTPATIENT)
Dept: PULMONOLOGY | Facility: CLINIC | Age: 58
End: 2023-12-26
Payer: COMMERCIAL

## 2023-12-26 VITALS
WEIGHT: 182 LBS | TEMPERATURE: 98 F | RESPIRATION RATE: 18 BRPM | HEIGHT: 64 IN | DIASTOLIC BLOOD PRESSURE: 81 MMHG | HEART RATE: 86 BPM | BODY MASS INDEX: 31.07 KG/M2 | SYSTOLIC BLOOD PRESSURE: 126 MMHG | OXYGEN SATURATION: 96 %

## 2023-12-26 DIAGNOSIS — J44.1 COPD WITH ACUTE EXACERBATION: Primary | ICD-10-CM

## 2023-12-26 DIAGNOSIS — Z72.0 TOBACCO ABUSE: ICD-10-CM

## 2023-12-26 DIAGNOSIS — J44.1 COPD WITH ACUTE EXACERBATION: ICD-10-CM

## 2023-12-26 PROCEDURE — 99214 OFFICE O/P EST MOD 30 MIN: CPT | Performed by: INTERNAL MEDICINE

## 2023-12-26 PROCEDURE — 71046 X-RAY EXAM CHEST 2 VIEWS: CPT

## 2023-12-26 RX ORDER — TIOTROPIUM BROMIDE AND OLODATEROL 3.124; 2.736 UG/1; UG/1
2 SPRAY, METERED RESPIRATORY (INHALATION)
Qty: 1 EACH | Refills: 11 | Status: SHIPPED | OUTPATIENT
Start: 2023-12-26

## 2023-12-26 RX ORDER — PROMETHAZINE HYDROCHLORIDE 25 MG/1
25 TABLET ORAL EVERY 6 HOURS PRN
COMMUNITY
Start: 2023-10-09

## 2023-12-26 RX ORDER — ROFLUMILAST 250 UG/1
250 TABLET ORAL DAILY
Qty: 90 TABLET | Refills: 5 | Status: SHIPPED | OUTPATIENT
Start: 2023-12-26

## 2023-12-26 RX ORDER — AMOXICILLIN 500 MG/1
500 CAPSULE ORAL 3 TIMES DAILY
Qty: 21 CAPSULE | Refills: 0 | Status: SHIPPED | OUTPATIENT
Start: 2023-12-26 | End: 2024-01-02

## 2023-12-26 NOTE — PROGRESS NOTES
Pulmonary Office Follow-up    Subjective     Lisy Almeida is seen today at the office for   Chief Complaint   Patient presents with    COPD with acute exacerbation    Follow-up     3 month     Cough    Wheezing     A little          HPI  Lisy Almeida is a 58 y.o. female with a PMH significant for COPD and tobacco abuse presents for follow-up patient complains of left rib pain along with cough wheeze and yellowish mucopurulent sputum she continues to smoke but is cutting back and plans to quit by the end patient denies any fever or hemoptysis      Tobacco use history:        There is no problem list on file for this patient.      Review of Systems  Review of Systems   Respiratory:  Positive for cough, shortness of breath and wheezing.    Cardiovascular:  Positive for chest pain.   All other systems reviewed and are negative.    As described in the HPI. Otherwise, remainder of ROS (14 systems) were negative.    Medications, Allergies, Social, and Family Histories reviewed as per EMR.    Result Review :       Data reviewed : Radiologic studies        Objective     Vitals:    12/26/23 0851   BP: 126/81   Pulse: 86   Resp: 18   Temp: 98 °F (36.7 °C)   SpO2: 96%         12/26/23  0851   Weight: 82.6 kg (182 lb)       Physical Exam  Vitals and nursing note reviewed.   Constitutional:       Appearance: Normal appearance.   HENT:      Head: Normocephalic and atraumatic.      Nose: Nose normal.      Mouth/Throat:      Mouth: Mucous membranes are moist.      Pharynx: Oropharynx is clear.   Eyes:      Extraocular Movements: Extraocular movements intact.      Conjunctiva/sclera: Conjunctivae normal.      Pupils: Pupils are equal, round, and reactive to light.   Cardiovascular:      Rate and Rhythm: Normal rate and regular rhythm.      Pulses: Normal pulses.      Heart sounds: Normal heart sounds.   Pulmonary:      Effort: Pulmonary effort is normal.      Breath sounds: Wheezing and rhonchi present.   Abdominal:       General: Abdomen is flat. Bowel sounds are normal.      Palpations: Abdomen is soft.   Musculoskeletal:         General: Normal range of motion.      Cervical back: Normal range of motion and neck supple.   Skin:     General: Skin is warm.      Capillary Refill: Capillary refill takes 2 to 3 seconds.   Neurological:      General: No focal deficit present.      Mental Status: She is alert and oriented to person, place, and time.   Psychiatric:         Mood and Affect: Mood normal.         Behavior: Behavior normal.         No radiology results for the last 90 days.     Assessment & Plan     Diagnoses and all orders for this visit:    1. COPD with acute exacerbation (Primary)  -     XR Chest 2 View; Future    2. Tobacco abuse  -     XR Chest 2 View; Future    Other orders  -     amoxicillin (AMOXIL) 500 MG capsule; Take 1 capsule by mouth 3 (Three) Times a Day for 7 days.  Dispense: 21 capsule; Refill: 0  -     roflumilast (DALIRESP) 250 MCG tablet tablet; Take 1 tablet by mouth Daily.  Dispense: 90 tablet; Refill: 5  -     tiotropium bromide-olodaterol (Stiolto Respimat) 2.5-2.5 MCG/ACT aerosol solution inhaler; Inhale 2 puffs Daily.  Dispense: 1 each; Refill: 11         Discussion/ Recommendations:   Will start her on Amoxil for bronchitis and COPD exacerbation  Continue albuterol on along with Stiolto  Continue Daliresp  Encouraged to quit smoking  We will order chest x-ray  Vaccinations discussed and recommended             Return in about 3 months (around 3/26/2024).          This document has been electronically signed by Marcos Brown MD on December 26, 2023 08:59 EST

## 2024-04-22 ENCOUNTER — OFFICE VISIT (OUTPATIENT)
Dept: PULMONOLOGY | Facility: CLINIC | Age: 59
End: 2024-04-22
Payer: COMMERCIAL

## 2024-04-22 ENCOUNTER — HOSPITAL ENCOUNTER (OUTPATIENT)
Dept: GENERAL RADIOLOGY | Facility: HOSPITAL | Age: 59
Discharge: HOME | End: 2024-04-22
Admitting: INTERNAL MEDICINE
Payer: COMMERCIAL

## 2024-04-22 VITALS
WEIGHT: 193.6 LBS | OXYGEN SATURATION: 95 % | BODY MASS INDEX: 33.05 KG/M2 | HEART RATE: 67 BPM | SYSTOLIC BLOOD PRESSURE: 138 MMHG | TEMPERATURE: 98 F | HEIGHT: 64 IN | DIASTOLIC BLOOD PRESSURE: 85 MMHG | RESPIRATION RATE: 14 BRPM

## 2024-04-22 DIAGNOSIS — Z72.0 TOBACCO ABUSE: ICD-10-CM

## 2024-04-22 DIAGNOSIS — J44.1 COPD WITH ACUTE EXACERBATION: Primary | ICD-10-CM

## 2024-04-22 DIAGNOSIS — J44.1 COPD WITH ACUTE EXACERBATION: ICD-10-CM

## 2024-04-22 PROCEDURE — 99214 OFFICE O/P EST MOD 30 MIN: CPT | Performed by: INTERNAL MEDICINE

## 2024-04-22 PROCEDURE — 71046 X-RAY EXAM CHEST 2 VIEWS: CPT

## 2024-04-22 RX ORDER — AZITHROMYCIN 250 MG/1
250 TABLET, FILM COATED ORAL DAILY
COMMUNITY

## 2024-04-22 RX ORDER — AMOXICILLIN 500 MG/1
500 CAPSULE ORAL 3 TIMES DAILY
Qty: 21 CAPSULE | Refills: 0 | Status: SHIPPED | OUTPATIENT
Start: 2024-04-22 | End: 2024-04-29

## 2024-04-22 NOTE — PROGRESS NOTES
Pulmonary Office Follow-up    Subjective     Lisy Almeida is seen today at the office for   Chief Complaint   Patient presents with    Follow-up     3 month    COPD    tobacco use    Shortness of Breath    Cough    Wheezing         HPI  Lisy Almeida is a 58 y.o. female with a PMH significant for COPD and tobacco abuse presents for follow-up patient complains of cough with wheeze and mucopurulent expectoration she also complains of chest congestion and smothering she is cutting back on her smoking      Tobacco use history:        There is no problem list on file for this patient.      Review of Systems  Review of Systems   Respiratory:  Positive for cough, shortness of breath and wheezing.    All other systems reviewed and are negative.    As described in the HPI. Otherwise, remainder of ROS (14 systems) were negative.    Medications, Allergies, Social, and Family Histories reviewed as per EMR.    Result Review :            Objective     Vitals:    04/22/24 1555   BP: 138/85   Pulse: 67   Resp: 14   Temp: 98 °F (36.7 °C)   SpO2: 95%         04/22/24  1555   Weight: 87.8 kg (193 lb 9.6 oz)       Physical Exam  Vitals and nursing note reviewed.   Constitutional:       Appearance: Normal appearance.   HENT:      Head: Normocephalic and atraumatic.      Nose: Nose normal.      Mouth/Throat:      Mouth: Mucous membranes are moist.      Pharynx: Oropharynx is clear.   Eyes:      Extraocular Movements: Extraocular movements intact.      Conjunctiva/sclera: Conjunctivae normal.      Pupils: Pupils are equal, round, and reactive to light.   Cardiovascular:      Rate and Rhythm: Normal rate and regular rhythm.      Pulses: Normal pulses.      Heart sounds: Normal heart sounds.   Pulmonary:      Effort: Pulmonary effort is normal.      Breath sounds: Wheezing and rhonchi present.   Abdominal:      General: Abdomen is flat. Bowel sounds are normal.      Palpations: Abdomen is soft.   Musculoskeletal:         General:  Normal range of motion.      Cervical back: Normal range of motion and neck supple.   Skin:     General: Skin is warm.      Capillary Refill: Capillary refill takes 2 to 3 seconds.   Neurological:      General: No focal deficit present.      Mental Status: She is alert and oriented to person, place, and time.   Psychiatric:         Mood and Affect: Mood normal.         Behavior: Behavior normal.         No radiology results for the last 90 days.     Assessment & Plan     Diagnoses and all orders for this visit:    1. COPD with acute exacerbation (Primary)  -     XR Chest 2 View; Future    2. Tobacco abuse  -     XR Chest 2 View; Future    Other orders  -     amoxicillin (AMOXIL) 500 MG capsule; Take 1 capsule by mouth 3 (Three) Times a Day for 7 days.  Dispense: 21 capsule; Refill: 0         Discussion/ Recommendations:   Will order chest x-ray  Will start her on Amoxil for 1 week  Continue prednisone for 1 week  Continue Stiolto daily  Albuterol inhaler 2 puffs every 6 hours  Encouraged to quit smoking  Vaccinations discussed and recommended    BMI is >= 30 and <35. (Class 1 Obesity). The following options were offered after discussion;: weight loss educational material (shared in after visit summary) and exercise counseling/recommendations        Return in about 3 months (around 7/22/2024).          This document has been electronically signed by Marcos Brown MD on April 22, 2024 16:01 EDT

## 2024-05-06 ENCOUNTER — OFFICE VISIT (OUTPATIENT)
Dept: CARDIOLOGY | Facility: CLINIC | Age: 59
End: 2024-05-06
Payer: COMMERCIAL

## 2024-05-06 VITALS
SYSTOLIC BLOOD PRESSURE: 142 MMHG | HEART RATE: 90 BPM | BODY MASS INDEX: 33.8 KG/M2 | DIASTOLIC BLOOD PRESSURE: 86 MMHG | HEIGHT: 64 IN | WEIGHT: 198 LBS

## 2024-05-06 DIAGNOSIS — R06.02 SHORTNESS OF BREATH: ICD-10-CM

## 2024-05-06 DIAGNOSIS — I50.32 DIASTOLIC CHF, CHRONIC: Primary | ICD-10-CM

## 2024-05-06 DIAGNOSIS — Z72.0 NICOTINE USE: ICD-10-CM

## 2024-05-06 DIAGNOSIS — R00.2 PALPITATIONS: ICD-10-CM

## 2024-05-06 PROCEDURE — 99214 OFFICE O/P EST MOD 30 MIN: CPT | Performed by: SPECIALIST

## 2024-06-06 ENCOUNTER — TELEPHONE (OUTPATIENT)
Dept: CARDIOLOGY | Facility: CLINIC | Age: 59
End: 2024-06-06
Payer: COMMERCIAL

## 2024-06-06 DIAGNOSIS — I49.3 FREQUENT PVCS: Primary | ICD-10-CM

## 2024-06-06 DIAGNOSIS — I50.32 DIASTOLIC CHF, CHRONIC: ICD-10-CM

## 2024-06-06 RX ORDER — METOPROLOL SUCCINATE 25 MG/1
25 TABLET, EXTENDED RELEASE ORAL DAILY
Qty: 90 TABLET | Refills: 3 | Status: SHIPPED | OUTPATIENT
Start: 2024-06-06

## 2024-06-06 NOTE — TELEPHONE ENCOUNTER
----- Message from Mine Pink sent at 6/6/2024  1:48 PM EDT -----  Also let her know I placed orders for some blood work and would like to have her drawn to check her electrolytes.

## 2024-06-06 NOTE — TELEPHONE ENCOUNTER
----- Message from Mine Pink sent at 6/6/2024  1:01 PM EDT -----  Called and left a voicemail for patient.  She has a high number of PVCs on Holter study, which would be what is causing her symptoms of palpitations.  Dr. Stevens's recommendation is to start her on beta-blocker medication, I have sent a prescription for metoprolol 25 mg once daily to her pharmacy.  Also considering the significant number of PVCs she is having we want her to complete a stress test, I have placed orders for this she should get contacted by scheduling, but there is also an outstanding order for echocardiogram she needs to schedule as well.  We will determine follow-up after testing is completed.

## 2024-06-10 NOTE — TELEPHONE ENCOUNTER
Attempted to call patient. No answer. VM left with return call requested.     [facility]illant message sent

## 2024-06-11 ENCOUNTER — TELEPHONE (OUTPATIENT)
Dept: CARDIOLOGY | Facility: CLINIC | Age: 59
End: 2024-06-11
Payer: COMMERCIAL

## 2024-06-11 NOTE — TELEPHONE ENCOUNTER
Hub staff attempted to follow warm transfer process and was unsuccessful     Caller: Lisy Almeida    Relationship to patient: Self    Best call back number: [phone]     Patient is needing: PATIENT RETURNED ARACELY'S CALL.

## 2024-06-12 ENCOUNTER — TELEPHONE (OUTPATIENT)
Dept: PULMONOLOGY | Facility: CLINIC | Age: 59
End: 2024-06-12

## 2024-06-12 NOTE — TELEPHONE ENCOUNTER
Caller: LUIS MANUEL BEGUM PCP      Best call back number: [phone]    Patient is needing: WOULD LIKE TO SPEAK TO A NURSE ASAP IN REGARDS TO PATIENT, SHE HAS SEEN PATIENT A FEW TIMES AND PATIENT HAS BEEN TO ER AND CANNOT GET HER WELL

## 2024-06-13 ENCOUNTER — OFFICE VISIT (OUTPATIENT)
Dept: PULMONOLOGY | Facility: CLINIC | Age: 59
End: 2024-06-13
Payer: COMMERCIAL

## 2024-06-13 ENCOUNTER — HOSPITAL ENCOUNTER (OUTPATIENT)
Dept: CT IMAGING | Facility: HOSPITAL | Age: 59
Discharge: HOME | End: 2024-06-13
Payer: COMMERCIAL

## 2024-06-13 VITALS
HEART RATE: 88 BPM | SYSTOLIC BLOOD PRESSURE: 128 MMHG | HEIGHT: 64 IN | DIASTOLIC BLOOD PRESSURE: 77 MMHG | TEMPERATURE: 98 F | RESPIRATION RATE: 18 BRPM | OXYGEN SATURATION: 96 % | WEIGHT: 191.8 LBS | BODY MASS INDEX: 32.74 KG/M2

## 2024-06-13 DIAGNOSIS — R05.1 ACUTE COUGH: ICD-10-CM

## 2024-06-13 DIAGNOSIS — J44.1 COPD WITH ACUTE EXACERBATION: Primary | ICD-10-CM

## 2024-06-13 DIAGNOSIS — Z71.6 ENCOUNTER FOR SMOKING CESSATION COUNSELING: ICD-10-CM

## 2024-06-13 DIAGNOSIS — Z72.0 TOBACCO ABUSE: ICD-10-CM

## 2024-06-13 DIAGNOSIS — R06.00 DYSPNEA, UNSPECIFIED TYPE: ICD-10-CM

## 2024-06-13 DIAGNOSIS — B37.0 THRUSH: ICD-10-CM

## 2024-06-13 PROCEDURE — 71250 CT THORAX DX C-: CPT

## 2024-06-13 RX ORDER — GUAIFENESIN 600 MG/1
TABLET, EXTENDED RELEASE ORAL
COMMUNITY
Start: 2024-05-27

## 2024-06-13 RX ORDER — IPRATROPIUM BROMIDE 17 UG/1
AEROSOL, METERED RESPIRATORY (INHALATION)
COMMUNITY
Start: 2024-05-15

## 2024-06-13 RX ORDER — SULFAMETHOXAZOLE AND TRIMETHOPRIM 800; 160 MG/1; MG/1
1 TABLET ORAL EVERY 12 HOURS SCHEDULED
COMMUNITY
Start: 2024-05-24 | End: 2024-06-13

## 2024-06-13 RX ORDER — PREDNISONE 10 MG/1
TABLET ORAL
Qty: 31 TABLET | Refills: 0 | Status: SHIPPED | OUTPATIENT
Start: 2024-06-13

## 2024-06-13 RX ORDER — FLUTICASONE FUROATE, UMECLIDINIUM BROMIDE AND VILANTEROL TRIFENATATE 200; 62.5; 25 UG/1; UG/1; UG/1
POWDER RESPIRATORY (INHALATION)
COMMUNITY
Start: 2024-05-16

## 2024-06-13 RX ORDER — GUAIFENESIN AND DEXTROMETHORPHAN HYDROBROMIDE 600; 30 MG/1; MG/1
TABLET, EXTENDED RELEASE ORAL
COMMUNITY
Start: 2024-06-12

## 2024-06-13 RX ORDER — PREDNISONE 20 MG/1
TABLET ORAL
COMMUNITY
Start: 2024-05-24 | End: 2024-06-13

## 2024-06-13 RX ORDER — LEVALBUTEROL INHALATION SOLUTION 1.25 MG/3ML
SOLUTION RESPIRATORY (INHALATION)
COMMUNITY
Start: 2024-05-15

## 2024-06-13 RX ORDER — MONTELUKAST SODIUM 10 MG/1
TABLET ORAL
COMMUNITY
Start: 2024-05-16

## 2024-06-13 RX ORDER — BENZONATATE 200 MG/1
CAPSULE ORAL
COMMUNITY
Start: 2024-05-06

## 2024-06-13 NOTE — PROGRESS NOTES
Primary Care Provider  Gina Murcia APRN   Referring Provider  No ref. provider found      Patient Complaint  Acute, Shortness of Breath, and Cough      Subjective          Lisy Almeida presents to [facility] PULMONARY & CRITICAL CARE MEDICINE      History of Presenting Illness  Lisy Almeida is a 58 y.o. female patient of Dr. Brown with COPD, dyspnea, and tobacco use ongoing, here for acute visit.    Patient presents today with persistent cough and shortness of breath.  She has been on multiple rounds of antibiotics and steroids and has been seen 5 times in the past 6 weeks by her PCP for these symptoms, most recently yesterday, was given a Rocephin and steroid shot.  She also had an ER visit at [facility] 5/26/2024, was sent home.  Patient reports that her worsening symptoms have been ongoing since having COVID and then RSV last fall.  She has missed quite a bit of work because of these issues, works at [email or web address].  Patient was encouraged by her primary care provider to follow-up with us for further management, as she has been on so many steroids and antibiotics lately.  Today, patient denies any fevers or chills, no known sick contacts.  She continues to use Stiolto daily as well as albuterol as needed.  These medications only help a little bit temporarily.  Patient also takes Daliresp 250 mcg daily.  She takes Singulair for allergies.  Patient has also been taking Mucinex DM since yesterday.  She recently quit smoking, 11 pack years.  Patient denies any hemoptysis, swollen lymph nodes, weight loss, or night sweats.  Patient is usually able to perform ADLs with minor modifications.  I have personally reviewed the review of systems, past family, social, medical and surgical histories; and agree with their findings.      Review of Systems    Review of Systems   Constitutional:  Negative for activity change, chills, fatigue, fever, unexpected weight gain and unexpected  weight loss.   HENT:  Negative for congestion, ear discharge, ear pain, mouth sores, postnasal drip, rhinorrhea, sinus pressure, sore throat, swollen glands and trouble swallowing.    Eyes:  Negative for blurred vision, pain, discharge, itching and visual disturbance.   Respiratory:  Positive for cough (productive of creamy sputum) and shortness of breath. Negative for apnea, chest tightness, wheezing and stridor.         Discomfort across back, a little better today   Cardiovascular:  Negative for chest pain, palpitations and leg swelling.   Gastrointestinal:  Negative for abdominal distention, abdominal pain, constipation, diarrhea, nausea, vomiting, GERD and indigestion.   Musculoskeletal:  Negative for arthralgias, joint swelling and myalgias.   Skin:  Negative for color change.   Neurological:  Negative for dizziness, weakness, light-headedness and headache.      Sleep: Negative for Excessive daytime sleepiness  Negative for morning headaches  Negative for Snoring      Family History   Problem Relation Age of Onset    Heart disease Mother     Hypertension Mother     Diabetes Mother     Hypertension Father     Asthma Father     Cancer Father     Cancer Maternal Grandmother     Cancer Sister     Cancer Brother         Hi bone    Diabetes Sister         Social History     Socioeconomic History    Marital status: Single   Tobacco Use    Smoking status: Some Days     Current packs/day: 0.25     Average packs/day: 0.3 packs/day for 43.5 years (10.9 ttl pk-yrs)     Types: Cigarettes     Start date: 1/1/1981    Smokeless tobacco: Never    Tobacco comments:     Quit several times thru the years   Vaping Use    Vaping status: Never Used   Substance and Sexual Activity    Alcohol use: Never    Drug use: Never    Sexual activity: Not Currently     Partners: Male        Past Medical History:   Diagnosis Date    Asthma     Chronic bronchitis ?    COPD (chronic obstructive pulmonary disease)     Lung nodule ?         Immunization History   Administered Date(s) Administered    COVID-19 (MODERNA) 1st,2nd,3rd Dose Monovalent 04/23/2021, 05/21/2021    COVID-19 (MODERNA) Monovalent Original Booster 10/29/2021    Hepatitis A 09/09/2018, 03/08/2019    Tdap 05/23/2023       Allergies   Allergen Reactions    Codeine Shortness Of Breath    Levofloxacin Nausea And Vomiting          Current Outpatient Medications:     Aspirin 81 MG capsule, TAKE 1 TABLET BY MOUTH ONCE DAILY, Disp: , Rfl:     Atrovent HFA 17 MCG/ACT inhaler, , Disp: , Rfl:     clopidogrel (PLAVIX) 75 MG tablet, Take 1 tablet by mouth Daily., Disp: , Rfl:     guaifenesin-dextromethorphan 600-30 mg (MUCINEX DM)  MG tablet sustained-release 12 hour, , Disp: , Rfl:     hydroCHLOROthiazide (HYDRODIURIL) 12.5 MG tablet, Take 1 tablet by mouth Daily., Disp: , Rfl:     levalbuterol (XOPENEX) 1.25 MG/3ML nebulizer solution, , Disp: , Rfl:     MAGNESIUM PO, Take  by mouth., Disp: , Rfl:     metoprolol succinate XL (TOPROL-XL) 25 MG 24 hr tablet, Take 1 tablet by mouth Daily., Disp: 90 tablet, Rfl: 3    montelukast (SINGULAIR) 10 MG tablet, , Disp: , Rfl:     Potassium 99 MG tablet, Take 99 mg by mouth Daily., Disp: , Rfl:     roflumilast (DALIRESP) 250 MCG tablet tablet, Take 1 tablet by mouth Daily., Disp: 90 tablet, Rfl: 5    rosuvastatin (CRESTOR) 10 MG tablet, TAKE 1 TABLET BY MOUTH EVERY DAY AT BEDTIME FOR CHOLESTEROL, Disp: , Rfl:     tiotropium bromide-olodaterol (Stiolto Respimat) 2.5-2.5 MCG/ACT aerosol solution inhaler, Inhale 2 puffs Daily., Disp: 1 each, Rfl: 11    Trelegy Ellipta 200-62.5-25 MCG/ACT inhaler, , Disp: , Rfl:     vitamin D3 (Vitamin D) 125 MCG (5000 UT) capsule capsule, , Disp: , Rfl:     albuterol sulfate  (90 Base) MCG/ACT inhaler, Inhale 2 puffs Every 4 (Four) Hours As Needed. (Patient not taking: Reported on 6/13/2024), Disp: , Rfl:     benzonatate (TESSALON) 200 MG capsule, take 1 capsule by mouth every 8 hours as needed for cough  "(Patient not taking: Reported on 6/13/2024), Disp: , Rfl:     guaiFENesin (MUCINEX) 600 MG 12 hr tablet, Take  by mouth. (Patient not taking: Reported on 6/13/2024), Disp: , Rfl:     nystatin (MYCOSTATIN) 100,000 unit/mL suspension, Take 5 mL by mouth 4 (Four) Times a Day., Disp: 280 mL, Rfl: 0    predniSONE (DELTASONE) 10 MG tablet, Take 4 tabs daily x 3 days, then take 3 tabs daily x 3 days, then take 2 tabs daily x 3 days, then take 1 tab daily x 3 days, Disp: 31 tablet, Rfl: 0     Objective     Vital Signs:   /77 (BP Location: Right arm, Patient Position: Sitting, Cuff Size: Adult)   Pulse 88   Temp 98 °F (36.7 °C) (Oral)   Resp 18   Ht 162.6 cm (64\")   Wt 87 kg (191 lb 12.8 oz)   SpO2 96% Comment: ROOM AIR  BMI 32.92 kg/m²     Physical Exam  Constitutional:       General: She is not in acute distress.     Appearance: Normal appearance. She is normal weight. She is not ill-appearing.   HENT:      Right Ear: Tympanic membrane and ear canal normal.      Left Ear: Tympanic membrane and ear canal normal.      Nose: Nose normal.      Mouth/Throat:      Mouth: Mucous membranes are moist.      Pharynx: Oropharynx is clear.   Eyes:      Extraocular Movements: Extraocular movements intact.      Conjunctiva/sclera: Conjunctivae normal.      Pupils: Pupils are equal, round, and reactive to light.   Cardiovascular:      Rate and Rhythm: Normal rate. Rhythm irregular.      Pulses: Normal pulses.      Heart sounds: Normal heart sounds.   Pulmonary:      Effort: Pulmonary effort is normal. No respiratory distress.      Breath sounds: No stridor. Rhonchi present. No wheezing or rales.   Abdominal:      General: Bowel sounds are normal.      Palpations: Abdomen is soft.   Musculoskeletal:         General: No swelling. Normal range of motion.      Cervical back: Normal range of motion and neck supple.      Right lower leg: No edema.      Left lower leg: No edema.   Skin:     General: Skin is warm and dry. "   Neurological:      General: No focal deficit present.      Mental Status: She is alert and oriented to person, place, and time.      Motor: No weakness.   Psychiatric:         Mood and Affect: Mood normal.         Behavior: Behavior normal.        Result Review :   I have personally reviewed patient's labs and images.            Diagnoses and all orders for this visit:    1. COPD with acute exacerbation (Primary)  -     predniSONE (DELTASONE) 10 MG tablet; Take 4 tabs daily x 3 days, then take 3 tabs daily x 3 days, then take 2 tabs daily x 3 days, then take 1 tab daily x 3 days  Dispense: 31 tablet; Refill: 0    2. Acute cough  -     CT Chest Without Contrast; Future    3. Dyspnea, unspecified type  -     CT Chest Without Contrast; Future    4. Thrush  -     nystatin (MYCOSTATIN) 100,000 unit/mL suspension; Take 5 mL by mouth 4 (Four) Times a Day.  Dispense: 280 mL; Refill: 0    5. Tobacco abuse    6. Encounter for smoking cessation counseling       Impression and Plan    -Prednisone taper prescribed today for ongoing COPD exacerbation.  Will order chest CT to evaluate for pneumonia, other acute abnormalities.  Patient had CXR done at [facility] a couple of weeks ago, which she reports was normal.  -Alpha-1 antitrypsin genotype MS, quantity not sufficient to determine level  -PFTs 12/12/2022 showed mild obstructive defect without significant response to bronchodilator.  Slight air trapping present, DLCO normal.  -CT chest 8/29/2023 showed upper lobe predominant groundglass centrilobular nodules, likely representing smoking-related respiratory bronchiolitis.  No other abnormalities seen.  -Continue using Stiolto 2 puffs daily  -Continue using albuterol inhaler or albuterol nebulizer treatments as needed  -Continue taking Daliresp 250 mcg daily  -Encouraged patient to continue with recent smoking cessation.  I spent 5 minutes today counseling patient on the risks of smoking, including throat cancer,  lung cancer, COPD, heart disease and death.  -Continue following up with Dr. Stevens with cardiology for further evaluation of PVCs seen on heart monitor  -Patient to keep regularly scheduled appointment with Dr. Brown 8/1/2024, may return sooner if needed    Smoking status: Reviewed  Vaccination status: Patient reports she is up-to-date with her flu, pneumonia, and Covid vaccines.  Patient is advised to continue to follow CDC recommendations such as social distancing wearing a mask and washing hands for at least 20 seconds.  Medications personally reviewed    Follow Up   No follow-ups on file.  Patient was given instructions and counseling regarding her condition or for health maintenance advice. Please see specific information pulled into the AVS if appropriate.

## 2024-06-14 ENCOUNTER — PATIENT MESSAGE (OUTPATIENT)
Dept: CARDIOLOGY | Facility: CLINIC | Age: 59
End: 2024-06-14
Payer: COMMERCIAL

## 2024-06-20 ENCOUNTER — TELEPHONE (OUTPATIENT)
Dept: CARDIOLOGY | Facility: CLINIC | Age: 59
End: 2024-06-20
Payer: COMMERCIAL

## 2024-06-20 DIAGNOSIS — E87.6 HYPOKALEMIA: Primary | ICD-10-CM

## 2024-06-20 RX ORDER — POTASSIUM CHLORIDE 750 MG/1
10 TABLET, FILM COATED, EXTENDED RELEASE ORAL DAILY
Qty: 90 TABLET | Refills: 3 | Status: SHIPPED | OUTPATIENT
Start: 2024-06-20

## 2024-06-20 NOTE — TELEPHONE ENCOUNTER
----- Message from Brianna Pastrana sent at 6/19/2024  4:13 PM EDT -----  Renal function is stable, potassium is low, sodium and magnesium are in normal ranges.  Recommend potassium chloride 10 mill equivalent daily.  Repeat BMP in 1 week  ----- Message -----  From: Traci Waldron  Sent: 6/19/2024   9:13 AM EDT  To: DEBORAH Rubi      ----- Message -----  From: Pennie Aponte  Sent: 6/19/2024   8:43 AM EDT  To: [facility]

## 2024-06-27 ENCOUNTER — TELEPHONE (OUTPATIENT)
Dept: CARDIOLOGY | Facility: CLINIC | Age: 59
End: 2024-06-27
Payer: COMMERCIAL

## 2024-06-27 DIAGNOSIS — R00.2 PALPITATIONS: ICD-10-CM

## 2024-06-27 NOTE — TELEPHONE ENCOUNTER
Chemistry panel shows normal electrolytes and renal function.  From a cardiac standpoint no treatment changes are needed at this time.

## 2024-07-03 DIAGNOSIS — I50.32 DIASTOLIC CHF, CHRONIC: ICD-10-CM

## 2024-07-03 DIAGNOSIS — I49.3 FREQUENT PVCS: ICD-10-CM

## 2024-07-12 ENCOUNTER — TELEPHONE (OUTPATIENT)
Dept: CARDIOLOGY | Facility: CLINIC | Age: 59
End: 2024-07-12
Payer: COMMERCIAL

## 2024-07-12 DIAGNOSIS — I50.32 DIASTOLIC CHF, CHRONIC: Primary | ICD-10-CM

## 2024-07-12 NOTE — TELEPHONE ENCOUNTER
----- Message from Avvenu sent at 7/12/2024  3:23 PM EDT -----  Echocardiogram shows mildly reduced EF 43-47% and mild mitral regurgitation murmur is present. Start farxiga 10 mg daily. Check BMP and BNP in 2 weeks

## 2024-07-15 ENCOUNTER — TELEPHONE (OUTPATIENT)
Dept: CARDIOLOGY | Facility: CLINIC | Age: 59
End: 2024-07-15
Payer: COMMERCIAL

## 2024-07-15 RX ORDER — DAPAGLIFLOZIN 10 MG/1
10 TABLET, FILM COATED ORAL DAILY
Qty: 90 TABLET | Refills: 3 | Status: SHIPPED | OUTPATIENT
Start: 2024-07-15

## 2024-07-15 NOTE — TELEPHONE ENCOUNTER
----- Message from Brianna Pastrana sent at 7/14/2024 10:56 PM EDT -----  Stress test is mildly abnormal and recent abnormal echo as well. Please move follow up with Dr Stevens sooner than November, either on Tuesday or one day next week with me

## 2024-07-15 NOTE — TELEPHONE ENCOUNTER
SW patient regarding results and recommendations. Voiced understanding.      Patient states she can only see Dr Stevens in [place]. Patient cannot drive to [facility]. Apt made for 8/8 per patient request

## 2024-07-16 NOTE — TELEPHONE ENCOUNTER
Okay, thanks  If she develops new/worsening cardiac symptoms prior to appt, then she should go to ER

## 2024-08-01 ENCOUNTER — OFFICE VISIT (OUTPATIENT)
Dept: PULMONOLOGY | Facility: CLINIC | Age: 59
End: 2024-08-01
Payer: COMMERCIAL

## 2024-08-01 VITALS
SYSTOLIC BLOOD PRESSURE: 149 MMHG | HEART RATE: 85 BPM | DIASTOLIC BLOOD PRESSURE: 89 MMHG | OXYGEN SATURATION: 94 % | HEIGHT: 64 IN | WEIGHT: 195 LBS | RESPIRATION RATE: 16 BRPM | BODY MASS INDEX: 33.29 KG/M2 | TEMPERATURE: 97.6 F

## 2024-08-01 DIAGNOSIS — J41.8 MIXED SIMPLE AND MUCOPURULENT CHRONIC BRONCHITIS: Primary | ICD-10-CM

## 2024-08-01 PROCEDURE — 99214 OFFICE O/P EST MOD 30 MIN: CPT | Performed by: INTERNAL MEDICINE

## 2024-08-01 RX ORDER — CLINDAMYCIN HYDROCHLORIDE 300 MG/1
300 CAPSULE ORAL
COMMUNITY
Start: 2024-07-25

## 2024-08-01 RX ORDER — NICOTINE 21 MG/24HR
1 PATCH, TRANSDERMAL 24 HOURS TRANSDERMAL EVERY 24 HOURS
COMMUNITY
Start: 2024-07-25

## 2024-08-01 RX ORDER — MONTELUKAST SODIUM 10 MG/1
10 TABLET ORAL NIGHTLY
Qty: 30 TABLET | Refills: 11 | Status: SHIPPED | OUTPATIENT
Start: 2024-08-01

## 2024-08-01 RX ORDER — SILVER SULFADIAZINE 1 %
1 CREAM (GRAM) TOPICAL
COMMUNITY
Start: 2024-07-25

## 2024-08-01 RX ORDER — IBUPROFEN 800 MG/1
800 TABLET ORAL EVERY 6 HOURS PRN
COMMUNITY
Start: 2024-07-25

## 2024-08-01 NOTE — PROGRESS NOTES
Pulmonary Office Follow-up    Subjective     Lisy Almeida is seen today at the office for   Chief Complaint   Patient presents with    Follow-up     3 month    COPD    Bronchitis    Results    tobacco use    Shortness of Breath         HPI  Lisy Almeida is a 58 y.o. female with a PMH significant for COPD and recurrent bronchitis with nasal allergies presents for follow-up patient has been doing well on her medications she takes her Trelegy daily patient denies any chest pain fever or hemoptysis      Tobacco use history:        There is no problem list on file for this patient.      Review of Systems  Review of Systems   Respiratory:  Positive for cough, shortness of breath and wheezing.    All other systems reviewed and are negative.    As described in the HPI. Otherwise, remainder of ROS (14 systems) were negative.    Medications, Allergies, Social, and Family Histories reviewed as per EMR.    Result Review :            Objective     Vitals:    08/01/24 1550   BP: 149/89   Pulse: 85   Resp: 16   Temp: 97.6 °F (36.4 °C)   SpO2: 94%         08/01/24  1550   Weight: 88.5 kg (195 lb)       Physical Exam  Vitals and nursing note reviewed.   Constitutional:       Appearance: Normal appearance.   HENT:      Head: Normocephalic and atraumatic.      Nose: Congestion present.      Mouth/Throat:      Mouth: Mucous membranes are moist.      Pharynx: Oropharynx is clear.   Eyes:      Extraocular Movements: Extraocular movements intact.      Conjunctiva/sclera: Conjunctivae normal.      Pupils: Pupils are equal, round, and reactive to light.   Cardiovascular:      Rate and Rhythm: Normal rate and regular rhythm.      Pulses: Normal pulses.      Heart sounds: Normal heart sounds.   Pulmonary:      Effort: Pulmonary effort is normal.      Breath sounds: Wheezing and rhonchi present.   Abdominal:      General: Abdomen is flat. Bowel sounds are normal.      Palpations: Abdomen is soft.   Musculoskeletal:         General:  Normal range of motion.      Cervical back: Normal range of motion and neck supple.   Skin:     General: Skin is warm.      Capillary Refill: Capillary refill takes 2 to 3 seconds.   Neurological:      General: No focal deficit present.      Mental Status: She is alert and oriented to person, place, and time.   Psychiatric:         Mood and Affect: Mood normal.         Behavior: Behavior normal.         CT Chest Without Contrast Diagnostic    Result Date: 6/13/2024  Impression: 1. No acute cardiopulmonary disease. Electronically Signed: Dirk Sage MD  6/13/2024 2:32 PM EDT  Workstation ID: [id number]      Assessment & Plan     Diagnoses and all orders for this visit:    1. Mixed simple and mucopurulent chronic bronchitis (Primary)    Other orders  -     montelukast (SINGULAIR) 10 MG tablet; Take 1 tablet by mouth Every Night.  Dispense: 30 tablet; Refill: 11         Discussion/ Recommendations:   CT scan reviewed no infiltrates or nodules noted  Patient advised to stop smoking  Continue Trelegy daily  Albuterol inhaler 2 puffs every 6 hours as needed  Refill Singulair  Vaccinations discussed and recommended             Return in about 6 months (around 2/1/2025).          This document has been electronically signed by Marcos Brown MD on August 1, 2024 15:57 EDT

## 2024-08-05 ENCOUNTER — TELEPHONE (OUTPATIENT)
Dept: PULMONOLOGY | Facility: CLINIC | Age: 59
End: 2024-08-05

## 2024-08-05 NOTE — TELEPHONE ENCOUNTER
Caller: Lisy Almeida    Relationship: Self    Best call back number: [phone]     Requested Prescriptions:   TRELEGY 3 MONTH SUPPLY    Pharmacy where request should be sent: [address] [phone] [facility] [phone] [facility]     Last office visit with prescribing clinician: 8/1/2024     Next office visit with prescribing clinician: 2/17/2025     Additional details provided by patient: AS DISCUSSED WITH , PATIENT NEEDED SINGULAIR AND TRELEGY. HOWEVER, ONLY THE SINGULAIR WAS SENT. SHE WILL NEED A 3 MONTH SUPPLY ON THE TRELEGY TO LOWER COST.     Does the patient have less than a 3 day supply:  [x] Yes  [] No    Would you like a call back once the refill request has been completed: [] Yes [x] No    If the office needs to give you a call back, can they leave a voicemail: [] Yes [x] No

## 2024-08-07 NOTE — PROGRESS NOTES
[facility]  Cardiology progress Note    Patient Name: Lisy Almeida  : 1965    CHIEF COMPLAINT  CHF        Subjective   Subjective     HISTORY OF PRESENT ILLNESS    Lisy Almeida is a 59 y.o. female with history of CHF and palpitations.  No further palpitations.    REVIEW OF SYSTEMS    Constitutional:    No fever, no weight loss  Skin:     No rash  Otolaryngeal:    No difficulty swallowing  Cardiovascular: See HPI.  Pulmonary:    No cough, no sputum production    Personal History     Social History:    reports that she has been smoking cigarettes. She started smoking about 43 years ago. She has a 11 pack-year smoking history. She has never used smokeless tobacco. She reports that she does not drink alcohol and does not use drugs.    Home Medications:  Current Outpatient Medications on File Prior to Visit   Medication Sig    albuterol sulfate  (90 Base) MCG/ACT inhaler Inhale 2 puffs Every 4 (Four) Hours As Needed.    Aspirin 81 MG capsule TAKE 1 TABLET BY MOUTH ONCE DAILY    clopidogrel (PLAVIX) 75 MG tablet Take 1 tablet by mouth Daily.    dapagliflozin Propanediol (Farxiga) 10 MG tablet Take 10 mg by mouth Daily.    hydroCHLOROthiazide (HYDRODIURIL) 12.5 MG tablet Take 1 tablet by mouth Daily.    levalbuterol (XOPENEX) 1.25 MG/3ML nebulizer solution     MAGNESIUM PO Take  by mouth.    metoprolol succinate XL (TOPROL-XL) 50 MG 24 hr tablet Take 1 tablet by mouth 2 (Two) Times a Day.    montelukast (SINGULAIR) 10 MG tablet Take 1 tablet by mouth Every Night.    potassium chloride 10 MEQ CR tablet Take 1 tablet by mouth Daily.    roflumilast (DALIRESP) 250 MCG tablet tablet Take 1 tablet by mouth Daily.    rosuvastatin (CRESTOR) 10 MG tablet TAKE 1 TABLET BY MOUTH EVERY DAY AT BEDTIME FOR CHOLESTEROL    Trelegy Ellipta 200-62.5-25 MCG/ACT inhaler     vitamin D3 (Vitamin D) 125 MCG (5000 UT) capsule capsule     [DISCONTINUED] Atrovent HFA 17 MCG/ACT inhaler     [DISCONTINUED] benzonatate  (TESSALON) 200 MG capsule     [DISCONTINUED] clindamycin (CLEOCIN) 300 MG capsule Take 1 capsule by mouth.    [DISCONTINUED] Fluticasone-Umeclidin-Vilant (TRELEGY ELLIPTA) 200-62.5-25 MCG/ACT inhaler Inhale 1 puff Daily.    [DISCONTINUED] ibuprofen (ADVIL,MOTRIN) 800 MG tablet Take 1 tablet by mouth Every 6 (Six) Hours As Needed for Mild Pain.    [DISCONTINUED] metoprolol succinate XL (TOPROL-XL) 25 MG 24 hr tablet Take 1 tablet by mouth Daily.    [DISCONTINUED] nicotine (NICODERM CQ) 14 MG/24HR patch Place 1 patch on the skin as directed by provider Daily.    [DISCONTINUED] SSD 1 % cream Apply 1 Application topically to the appropriate area as directed.     No current facility-administered medications on file prior to visit.       Past Medical History:   Diagnosis Date    Abnormal ECG ?    Pvc    Asthma     Chronic bronchitis ?    COPD (chronic obstructive pulmonary disease)     Heart valve disease     Lung nodule ?    Mitral valve prolapse        Allergies:  Allergies   Allergen Reactions    Codeine Shortness Of Breath    Levofloxacin Nausea And Vomiting       Objective    Objective       Vitals:   Heart Rate:  [78] 78  BP: (144)/(72) 144/72  Body mass index is 32.96 kg/m².     PHYSICAL EXAM:    General Appearance:   well developed  well nourished  HENT:   oropharynx moist  lips not cyanotic  Neck:  thyroid not enlarged  supple  Respiratory:  no respiratory distress  normal breath sounds  no rales  Cardiovascular:  no jugular venous distention  regular rhythm  apical impulse normal  S1 normal, S2 normal  no S3, no S4   no murmur  no rub, no thrill  carotid pulses normal; no bruit  pedal pulses normal  lower extremity edema: none    Skin:   warm, dry  Psychiatric:  judgement and insight appropriate  normal mood and affect        Result Review:  I have personally reviewed the available results from  [x]  Laboratory  [x]  EKG  [x]  Cardiology  [x]  Medications  [x]  Old records  []  Other:     Procedures    Results  for orders placed in visit on 07/12/24    Adult Transthoracic Echo Complete W/ Cont if Necessary Per Protocol     Impression/Plan:  1.  Shortness of breath/chronic systolic heart failure: Recent echo shows an ejection fraction of 45%.  Continue Toprol-XL 50 mg twice a day.  Add Entresto 24/26 mg twice a day.  Continue Farxiga 10 mg once a day.  2.  COPD: Continue current bronchodilators.  3.  Positive nicotine use: Smoking cessation discussed with patient  4.  Mixed hyperlipidemia: Continue Crestor 10 mg once a day.  Monitor lipid and hepatic profile.  5.  Palpitations/PVCs: .  Continue Toprol-XL 50 mg twice a day.              Otto Stevens MD   08/08/24   13:39 EDT

## 2024-08-08 ENCOUNTER — OFFICE VISIT (OUTPATIENT)
Dept: CARDIOLOGY | Facility: CLINIC | Age: 59
End: 2024-08-08
Payer: COMMERCIAL

## 2024-08-08 VITALS
DIASTOLIC BLOOD PRESSURE: 72 MMHG | WEIGHT: 192 LBS | SYSTOLIC BLOOD PRESSURE: 144 MMHG | BODY MASS INDEX: 32.78 KG/M2 | HEART RATE: 78 BPM | HEIGHT: 64 IN

## 2024-08-08 DIAGNOSIS — E78.2 HYPERLIPEMIA, MIXED: ICD-10-CM

## 2024-08-08 DIAGNOSIS — R06.02 SHORTNESS OF BREATH: Primary | ICD-10-CM

## 2024-08-08 DIAGNOSIS — R00.2 PALPITATIONS: ICD-10-CM

## 2024-08-08 DIAGNOSIS — I50.22 SYSTOLIC CHF, CHRONIC: ICD-10-CM

## 2024-08-08 PROCEDURE — 99214 OFFICE O/P EST MOD 30 MIN: CPT | Performed by: SPECIALIST

## 2024-08-08 RX ORDER — METOPROLOL SUCCINATE 50 MG/1
50 TABLET, EXTENDED RELEASE ORAL 2 TIMES DAILY
COMMUNITY
Start: 2024-08-05

## 2024-08-08 RX ORDER — SACUBITRIL AND VALSARTAN 24; 26 MG/1; MG/1
1 TABLET, FILM COATED ORAL 2 TIMES DAILY
Qty: 180 TABLET | Refills: 3 | Status: SHIPPED | OUTPATIENT
Start: 2024-08-08

## 2024-09-30 ENCOUNTER — TELEPHONE (OUTPATIENT)
Dept: CARDIOLOGY | Facility: CLINIC | Age: 59
End: 2024-09-30
Payer: COMMERCIAL

## 2024-09-30 NOTE — TELEPHONE ENCOUNTER
Caller: MARY KATE    Relationship: SELF    Best call back number: [phone] CALL AFTER 3P OR LEAVE MESSAGE IF NO ANSWER    Which medication are you concerned about: FARXIGA    Who prescribed you this medication: BRITTA JEROME    When did you start taking this medication: JUNE OR JULY 2024    What are your concerns: FREQUENT THRUSH - PATIENT HAS HAD THRUSH AT LEAST 5 TIMES SINCE STARTING THIS MEDICATION. PATIENT WOULD LIKE TO KNOW IF SOMETHING ELSE CAN BE GIVEN OR TAKEN OFF MEDICATION COMPLETELY. PLEASE CALL PATIENT TO ADVISE. THANK YOU!    How long have you had these concerns: SINCE STARTING THE MEDICATION

## 2024-11-11 NOTE — PROGRESS NOTES
[facility]  Cardiology progress Note    Patient Name: Lisy Almeida  : 1965    CHIEF COMPLAINT  CHF        Subjective   Subjective     HISTORY OF PRESENT ILLNESS    Lisy Almeida is a 59 y.o. female with history of CHF.  No palpitations.    REVIEW OF SYSTEMS    Constitutional:    No fever, no weight loss  Skin:     No rash  Otolaryngeal:    No difficulty swallowing  Cardiovascular: See HPI.  Pulmonary:    No cough, no sputum production    Personal History     Social History:    reports that she has been smoking cigarettes. She started smoking about 43 years ago. She has a 11 pack-year smoking history. She has never used smokeless tobacco. She reports that she does not drink alcohol and does not use drugs.    Home Medications:  Current Outpatient Medications on File Prior to Visit   Medication Sig    albuterol sulfate  (90 Base) MCG/ACT inhaler Inhale 2 puffs Every 4 (Four) Hours As Needed.    Aspirin 81 MG capsule TAKE 1 TABLET BY MOUTH ONCE DAILY    clopidogrel (PLAVIX) 75 MG tablet Take 1 tablet by mouth Daily.    Farxiga 10 MG tablet Take 10 mg by mouth Daily.    hydroCHLOROthiazide (HYDRODIURIL) 12.5 MG tablet Take 1 tablet by mouth Daily.    MAGNESIUM PO Take  by mouth.    metoprolol succinate XL (TOPROL-XL) 50 MG 24 hr tablet Take 1 tablet by mouth Daily.    montelukast (SINGULAIR) 10 MG tablet Take 1 tablet by mouth Every Night.    potassium chloride 10 MEQ CR tablet Take 1 tablet by mouth Daily.    roflumilast (DALIRESP) 250 MCG tablet tablet Take 1 tablet by mouth Daily.    rosuvastatin (CRESTOR) 10 MG tablet TAKE 1 TABLET BY MOUTH EVERY DAY AT BEDTIME FOR CHOLESTEROL    sacubitril-valsartan (Entresto) 24-26 MG tablet Take 1 tablet by mouth 2 (Two) Times a Day.    Trelegy Ellipta 200-62.5-25 MCG/ACT inhaler     vitamin D3 (Vitamin D) 125 MCG (5000 UT) capsule capsule     [DISCONTINUED] levalbuterol (XOPENEX) 1.25 MG/3ML nebulizer solution      No current facility-administered  medications on file prior to visit.       Past Medical History:   Diagnosis Date    Abnormal ECG ?    Pvc    Asthma     Chronic bronchitis ?    COPD (chronic obstructive pulmonary disease)     Heart valve disease     Lung nodule ?    Mitral valve prolapse        Allergies:  Allergies   Allergen Reactions    Codeine Shortness Of Breath    Farxiga [Dapagliflozin] Other (See Comments)     Frequent Thrush    Levofloxacin Nausea And Vomiting       Objective    Objective       Vitals:   Heart Rate:  [84] 84  BP: (116)/(59) 116/59  Body mass index is 34.01 kg/m².     PHYSICAL EXAM:    General Appearance:   well developed  well nourished  HENT:   oropharynx moist  lips not cyanotic  Neck:  thyroid not enlarged  supple  Respiratory:  no respiratory distress  normal breath sounds  no rales  Cardiovascular:  no jugular venous distention  regular rhythm  apical impulse normal  S1 normal, S2 normal  no S3, no S4   no murmur  no rub, no thrill  carotid pulses normal; no bruit  pedal pulses normal  lower extremity edema: none    Skin:   warm, dry  Psychiatric:  judgement and insight appropriate  normal mood and affect        Result Review:  I have personally reviewed the available results from  [x]  Laboratory  [x]  EKG  [x]  Cardiology  [x]  Medications  [x]  Old records  []  Other:     Procedures    Results for orders placed in visit on 07/12/24    Adult Transthoracic Echo Complete W/ Cont if Necessary Per Protocol     Impression/Plan:  1.  Shortness of breath/chronic systolic heart failure: Recent echo shows an ejection fraction of 45%.  Continue Toprol-XL 50 mg twice a day.  Continue Entresto 24/26 mg twice a day.  Has been having repeated yeast infections.  Discontinue Farxiga.  Add Lasix 20 mg once a day.  Discontinue hydrochlorothiazide.  Monitor BMP.  2.  COPD: Continue current bronchodilators.  3.  Positive nicotine use: Smoking cessation discussed with patient  4.  Mixed hyperlipidemia: Continue Crestor 10 mg once a day.   Monitor lipid and hepatic profile.  5.  Palpitations/PVCs: .  Continue Toprol-XL 50 mg twice a day.           Otto Stevens MD   11/14/24   14:04 EST

## 2024-11-14 ENCOUNTER — OFFICE VISIT (OUTPATIENT)
Dept: CARDIOLOGY | Facility: CLINIC | Age: 59
End: 2024-11-14
Payer: COMMERCIAL

## 2024-11-14 VITALS
HEART RATE: 84 BPM | HEIGHT: 63 IN | WEIGHT: 192 LBS | SYSTOLIC BLOOD PRESSURE: 116 MMHG | BODY MASS INDEX: 34.02 KG/M2 | DIASTOLIC BLOOD PRESSURE: 59 MMHG

## 2024-11-14 DIAGNOSIS — R06.02 SHORTNESS OF BREATH: Primary | ICD-10-CM

## 2024-11-14 DIAGNOSIS — R00.2 PALPITATIONS: ICD-10-CM

## 2024-11-14 DIAGNOSIS — E78.2 HYPERLIPEMIA, MIXED: ICD-10-CM

## 2024-11-14 DIAGNOSIS — Z72.0 NICOTINE USE: ICD-10-CM

## 2024-11-14 PROCEDURE — 99214 OFFICE O/P EST MOD 30 MIN: CPT | Performed by: SPECIALIST

## 2024-11-14 RX ORDER — FUROSEMIDE 20 MG/1
20 TABLET ORAL DAILY
Qty: 90 TABLET | Refills: 3 | Status: SHIPPED | OUTPATIENT
Start: 2024-11-14

## 2024-11-14 RX ORDER — DAPAGLIFLOZIN 10 MG/1
1 TABLET, FILM COATED ORAL DAILY
COMMUNITY
Start: 2024-10-31 | End: 2024-11-14

## 2024-11-14 NOTE — LETTER
2024     DEBORAH Calix  [address] [place] [address]    Patient: Lisy Almeida   YOB: 1965   Date of Visit: 2024       Dear DEBORAH Calix    Lisy Almeida was in my office today. Below is a copy of my note.    If you have questions, please do not hesitate to call me. I look forward to following Lisy along with you.         Sincerely,        Otto Stevens MD        CC: MD Betty Cameron APRN      [facility]  Cardiology progress Note    Patient Name: Lisy Almeida  : 1965    CHIEF COMPLAINT  CHF        Subjective  Subjective     HISTORY OF PRESENT ILLNESS    Lisy Almeida is a 59 y.o. female with history of CHF.  No palpitations.    REVIEW OF SYSTEMS    Constitutional:    No fever, no weight loss  Skin:     No rash  Otolaryngeal:    No difficulty swallowing  Cardiovascular: See HPI.  Pulmonary:    No cough, no sputum production    Personal History     Social History:    reports that she has been smoking cigarettes. She started smoking about 43 years ago. She has a 11 pack-year smoking history. She has never used smokeless tobacco. She reports that she does not drink alcohol and does not use drugs.    Home Medications:  Current Outpatient Medications on File Prior to Visit   Medication Sig   • albuterol sulfate  (90 Base) MCG/ACT inhaler Inhale 2 puffs Every 4 (Four) Hours As Needed.   • Aspirin 81 MG capsule TAKE 1 TABLET BY MOUTH ONCE DAILY   • clopidogrel (PLAVIX) 75 MG tablet Take 1 tablet by mouth Daily.   • Farxiga 10 MG tablet Take 10 mg by mouth Daily.   • hydroCHLOROthiazide (HYDRODIURIL) 12.5 MG tablet Take 1 tablet by mouth Daily.   • MAGNESIUM PO Take  by mouth.   • metoprolol succinate XL (TOPROL-XL) 50 MG 24 hr tablet Take 1 tablet by mouth Daily.   • montelukast (SINGULAIR) 10 MG tablet Take 1 tablet by mouth Every Night.   • potassium chloride 10 MEQ CR tablet Take 1 tablet by  mouth Daily.   • roflumilast (DALIRESP) 250 MCG tablet tablet Take 1 tablet by mouth Daily.   • rosuvastatin (CRESTOR) 10 MG tablet TAKE 1 TABLET BY MOUTH EVERY DAY AT BEDTIME FOR CHOLESTEROL   • sacubitril-valsartan (Entresto) 24-26 MG tablet Take 1 tablet by mouth 2 (Two) Times a Day.   • Trelegy Ellipta 200-62.5-25 MCG/ACT inhaler    • vitamin D3 (Vitamin D) 125 MCG (5000 UT) capsule capsule    • [DISCONTINUED] levalbuterol (XOPENEX) 1.25 MG/3ML nebulizer solution      No current facility-administered medications on file prior to visit.       Past Medical History:   Diagnosis Date   • Abnormal ECG ?    Pvc   • Asthma    • Chronic bronchitis ?   • COPD (chronic obstructive pulmonary disease)    • Heart valve disease    • Lung nodule ?   • Mitral valve prolapse        Allergies:  Allergies   Allergen Reactions   • Codeine Shortness Of Breath   • Farxiga [Dapagliflozin] Other (See Comments)     Frequent Thrush   • Levofloxacin Nausea And Vomiting       Objective   Objective       Vitals:   Heart Rate:  [84] 84  BP: (116)/(59) 116/59  Body mass index is 34.01 kg/m².     PHYSICAL EXAM:    General Appearance:   well developed  well nourished  HENT:   oropharynx moist  lips not cyanotic  Neck:  thyroid not enlarged  supple  Respiratory:  no respiratory distress  normal breath sounds  no rales  Cardiovascular:  no jugular venous distention  regular rhythm  apical impulse normal  S1 normal, S2 normal  no S3, no S4   no murmur  no rub, no thrill  carotid pulses normal; no bruit  pedal pulses normal  lower extremity edema: none    Skin:   warm, dry  Psychiatric:  judgement and insight appropriate  normal mood and affect        Result Review:  I have personally reviewed the available results from  [x]  Laboratory  [x]  EKG  [x]  Cardiology  [x]  Medications  [x]  Old records  []  Other:     Procedures    Results for orders placed in visit on 07/12/24    Adult Transthoracic Echo Complete W/ Cont if Necessary Per Protocol      Impression/Plan:  1.  Shortness of breath/chronic systolic heart failure: Recent echo shows an ejection fraction of 45%.  Continue Toprol-XL 50 mg twice a day.  Continue Entresto 24/26 mg twice a day.  Has been having repeated yeast infections.  Discontinue Farxiga.  Add Lasix 20 mg once a day.  Discontinue hydrochlorothiazide.  Monitor BMP.  2.  COPD: Continue current bronchodilators.  3.  Positive nicotine use: Smoking cessation discussed with patient  4.  Mixed hyperlipidemia: Continue Crestor 10 mg once a day.  Monitor lipid and hepatic profile.  5.  Palpitations/PVCs: .  Continue Toprol-XL 50 mg twice a day.           Otto Stevens MD   11/14/24   14:04 EST

## 2024-11-21 ENCOUNTER — TELEPHONE (OUTPATIENT)
Dept: CARDIOLOGY | Facility: CLINIC | Age: 59
End: 2024-11-21
Payer: COMMERCIAL

## 2024-11-21 NOTE — TELEPHONE ENCOUNTER
Increase lasix dose to 40 mg daily for the next 5 days and then go back to original dose. Encourage patient to restrict fluid intake to no more than 68 ounces/day. Restrict sodium intake to no more than 2000 mg/day. Advise compression socks/ACE wrap for swelling legs and elevated as much as possible.  Check BMP and BNP in 2 weeks.

## 2024-11-21 NOTE — TELEPHONE ENCOUNTER
Pt called stating that she saw Dr. Stevens 11/14/2024 who started her on Lasix 20mg once daily. Pt states that she has been having pedal edema x 3 days.      Medications discontinued at this appt:  Gabriela   Hydrochlorothiazide    Please advise.

## 2024-12-23 DIAGNOSIS — I50.32 DIASTOLIC CHF, CHRONIC: ICD-10-CM

## 2025-01-14 ENCOUNTER — TELEPHONE (OUTPATIENT)
Dept: CARDIOLOGY | Facility: CLINIC | Age: 60
End: 2025-01-14
Payer: COMMERCIAL

## 2025-01-14 NOTE — TELEPHONE ENCOUNTER
Received call from Jacqueline from [facility] stating patient has been in several times for trouble breathing and swelling. Attempted to call patient to get more information. No answer. VM left with return call requested.

## 2025-02-03 ENCOUNTER — TELEPHONE (OUTPATIENT)
Dept: CARDIOLOGY | Facility: CLINIC | Age: 60
End: 2025-02-03
Payer: COMMERCIAL

## 2025-02-03 NOTE — TELEPHONE ENCOUNTER
Caller: BURT JAMES    Relationship to patient: Emergency Contact    Best call back number: [phone]    Chief complaint: PULMONARY EMBOLISM     Type of visit: HOSPITAL FOLLOW UP     Requested date: 1 WEEK        Additional notes:PATIENT WAS ADMITTED TO [facility] IN [place]  ON 1/28/25 TO 2/2/25.

## 2025-02-04 NOTE — TELEPHONE ENCOUNTER
Attempted to contact PT, left  with appt to see Dr. Stevens in [place]. On 02/10 at 12:30 and to call back if the appt date does not work

## 2025-02-06 NOTE — PROGRESS NOTES
[facility]  Cardiology progress Note    Patient Name: Lisy Almeida  : 1965    CHIEF COMPLAINT  CHF        Subjective   Subjective     HISTORY OF PRESENT ILLNESS    Lisy Almeida is a 59 y.o. female with history of CHF.  No chest pain.  Recently in the hospital with pulmonary embolism.    REVIEW OF SYSTEMS    Constitutional:    No fever, no weight loss  Skin:     No rash  Otolaryngeal:    No difficulty swallowing  Cardiovascular: See HPI.  Pulmonary:    No cough, no sputum production    Personal History     Social History:    reports that she quit smoking about 5 weeks ago. Her smoking use included cigarettes. She started smoking about 44 years ago. She has a 11.1 pack-year smoking history. She has never used smokeless tobacco. She reports that she does not drink alcohol and does not use drugs.    Home Medications:  Current Outpatient Medications on File Prior to Visit   Medication Sig    albuterol sulfate  (90 Base) MCG/ACT inhaler Inhale 2 puffs Every 4 (Four) Hours As Needed.    apixaban (ELIQUIS) 5 MG tablet tablet Take 1 tablet by mouth 2 (Two) Times a Day.    atorvastatin (LIPITOR) 40 MG tablet Take 1 tablet by mouth Daily.    clopidogrel (PLAVIX) 75 MG tablet Take 1 tablet by mouth Daily.    furosemide (LASIX) 20 MG tablet Take 1 tablet by mouth Daily.    ipratropium-albuterol (DUO-NEB) 0.5-2.5 mg/3 ml nebulizer Take 3 mL by nebulization Every 4 (Four) Hours As Needed for Wheezing.    levalbuterol (XOPENEX) 1.25 MG/3ML nebulizer solution Take 1 ampule by nebulization Every 4 (Four) Hours As Needed for Wheezing.    MAGNESIUM PO Take 250 mg by mouth.    metFORMIN (GLUCOPHAGE) 500 MG tablet Take 1 tablet by mouth 2 (Two) Times a Day With Meals.    metoprolol tartrate (LOPRESSOR) 25 MG tablet Take 1 tablet by mouth 2 (Two) Times a Day.    montelukast (SINGULAIR) 10 MG tablet Take 1 tablet by mouth Every Night.    potassium chloride (KLOR-CON M10) 10 MEQ CR tablet Take 1 tablet by  mouth Daily.    roflumilast (DALIRESP) 250 MCG tablet tablet Take 1 tablet by mouth Daily.    sacubitril-valsartan (Entresto) 24-26 MG tablet Take 1 tablet by mouth 2 (Two) Times a Day.    Trelegy Ellipta 200-62.5-25 MCG/ACT inhaler     [DISCONTINUED] apixaban (ELIQUIS) 5 MG tablet tablet Take 1 tablet by mouth 2 (Two) Times a Day.    [DISCONTINUED] Aspirin 81 MG capsule TAKE 1 TABLET BY MOUTH ONCE DAILY    [DISCONTINUED] ipratropium-albuterol (DUO-NEB) 0.5-2.5 mg/3 ml nebulizer Take 3 mL by nebulization Every 4 (Four) Hours As Needed for Wheezing.    [DISCONTINUED] ketoconazole (NIZORAL) 200 MG tablet Take 1 tablet by mouth Daily.    [DISCONTINUED] metoprolol succinate XL (TOPROL-XL) 50 MG 24 hr tablet Take 1 tablet by mouth Daily.    [DISCONTINUED] potassium chloride 10 MEQ CR tablet Take 1 tablet by mouth Daily. (Patient not taking: Reported on 2/10/2025)    [DISCONTINUED] rosuvastatin (CRESTOR) 10 MG tablet TAKE 1 TABLET BY MOUTH EVERY DAY AT BEDTIME FOR CHOLESTEROL    [DISCONTINUED] vitamin D3 (Vitamin D) 125 MCG (5000 UT) capsule capsule      No current facility-administered medications on file prior to visit.       Past Medical History:   Diagnosis Date    Abnormal ECG ?    Pvc    Asthma     Chronic bronchitis ?    COPD (chronic obstructive pulmonary disease)     Heart valve disease     Hyperlipidemia     Lung nodule ?    Mitral valve prolapse     Pneumonia 1/3/25    Pulmonary embolism 1/24       Allergies:  Allergies   Allergen Reactions    Codeine Shortness Of Breath    Farxiga [Dapagliflozin] Other (See Comments)     Frequent Thrush    Levofloxacin Nausea And Vomiting       Objective    Objective       Vitals:   Temp:  [98.4 °F (36.9 °C)] 98.4 °F (36.9 °C)  Heart Rate:  [68-93] 93  Resp:  [18] 18  BP: ()/(55) 83/55  Body mass index is 32.77 kg/m².     PHYSICAL EXAM:    General Appearance:   well developed  well nourished  HENT:   oropharynx moist  lips not cyanotic  Neck:  thyroid not  enlarged  supple  Respiratory:  no respiratory distress  normal breath sounds  no rales  Cardiovascular:  no jugular venous distention  regular rhythm  apical impulse normal  S1 normal, S2 normal  no S3, no S4   no murmur  no rub, no thrill  carotid pulses normal; no bruit  pedal pulses normal  lower extremity edema: none    Skin:   warm, dry  Psychiatric:  judgement and insight appropriate  normal mood and affect        Result Review:  I have personally reviewed the available results from  [x]  Laboratory  [x]  EKG  [x]  Cardiology  [x]  Medications  [x]  Old records  []  Other:     Procedures    Results for orders placed in visit on 07/12/24    Adult Transthoracic Echo Complete W/ Cont if Necessary Per Protocol     Impression/Plan:  1.  Shortness of breath/chronic diastolic heart failure: Recent echo shows an ejection fraction of 45%.  Continue Toprol-XL 25 mg twice a day.  Decrease Entresto to 24/26 mg half a tablet twice a day in view of low blood pressure.  Continue Lasix 20 mg once a day.  Monitor BMP.  2.  COPD: Continue current bronchodilators.  3.  Positive nicotine use: Smoking cessation discussed with patient  4.  Mixed hyperlipidemia: Continue Lipitor 40 mg once a day.  Monitor lipid and hepatic profile.  5.  Palpitations/PVCs: .  Continue Toprol-XL 50 mg twice a day.  6.  Pulmonary embolism: Continue Eliquis 5 mg twice a day.  Reviewed her reports from the hospital.           Otto Stevens MD   02/10/25   12:46 EST

## 2025-02-10 ENCOUNTER — OFFICE VISIT (OUTPATIENT)
Dept: CARDIOLOGY | Facility: CLINIC | Age: 60
End: 2025-02-10
Payer: COMMERCIAL

## 2025-02-10 ENCOUNTER — OFFICE VISIT (OUTPATIENT)
Dept: PULMONOLOGY | Facility: CLINIC | Age: 60
End: 2025-02-10
Payer: COMMERCIAL

## 2025-02-10 VITALS
SYSTOLIC BLOOD PRESSURE: 83 MMHG | HEART RATE: 93 BPM | HEIGHT: 63 IN | BODY MASS INDEX: 32.78 KG/M2 | DIASTOLIC BLOOD PRESSURE: 55 MMHG | WEIGHT: 185 LBS

## 2025-02-10 VITALS
TEMPERATURE: 98.4 F | RESPIRATION RATE: 18 BRPM | OXYGEN SATURATION: 97 % | WEIGHT: 183 LBS | HEIGHT: 63 IN | BODY MASS INDEX: 32.43 KG/M2 | SYSTOLIC BLOOD PRESSURE: 109 MMHG | HEART RATE: 68 BPM | DIASTOLIC BLOOD PRESSURE: 55 MMHG

## 2025-02-10 DIAGNOSIS — Z86.711 HISTORY OF PULMONARY EMBOLISM: ICD-10-CM

## 2025-02-10 DIAGNOSIS — R00.2 PALPITATIONS: ICD-10-CM

## 2025-02-10 DIAGNOSIS — Z72.0 NICOTINE USE: ICD-10-CM

## 2025-02-10 DIAGNOSIS — J41.8 MIXED SIMPLE AND MUCOPURULENT CHRONIC BRONCHITIS: Primary | ICD-10-CM

## 2025-02-10 DIAGNOSIS — I42.0 NONISCHEMIC DILATED CARDIOMYOPATHY: ICD-10-CM

## 2025-02-10 DIAGNOSIS — Z72.0 TOBACCO ABUSE: ICD-10-CM

## 2025-02-10 DIAGNOSIS — E78.2 HYPERLIPEMIA, MIXED: ICD-10-CM

## 2025-02-10 DIAGNOSIS — I50.22 SYSTOLIC CHF, CHRONIC: Primary | ICD-10-CM

## 2025-02-10 PROCEDURE — 99214 OFFICE O/P EST MOD 30 MIN: CPT | Performed by: SPECIALIST

## 2025-02-10 PROCEDURE — 99214 OFFICE O/P EST MOD 30 MIN: CPT | Performed by: INTERNAL MEDICINE

## 2025-02-10 RX ORDER — KETOCONAZOLE 200 MG/1
200 TABLET ORAL DAILY
COMMUNITY
Start: 2024-12-07 | End: 2025-02-10

## 2025-02-10 RX ORDER — IPRATROPIUM BROMIDE AND ALBUTEROL SULFATE 2.5; .5 MG/3ML; MG/3ML
3 SOLUTION RESPIRATORY (INHALATION) EVERY 4 HOURS PRN
Qty: 360 ML | Refills: 3 | Status: SHIPPED | OUTPATIENT
Start: 2025-02-10

## 2025-02-10 RX ORDER — POTASSIUM CHLORIDE 750 MG/1
10 TABLET, EXTENDED RELEASE ORAL DAILY
COMMUNITY

## 2025-02-10 RX ORDER — SACUBITRIL AND VALSARTAN 24; 26 MG/1; MG/1
0.5 TABLET, FILM COATED ORAL 2 TIMES DAILY
Qty: 180 TABLET | Refills: 3 | Status: SHIPPED | OUTPATIENT
Start: 2025-02-10 | End: 2025-02-14

## 2025-02-10 RX ORDER — METOPROLOL TARTRATE 25 MG/1
25 TABLET, FILM COATED ORAL 2 TIMES DAILY
COMMUNITY
Start: 2025-02-03

## 2025-02-10 RX ORDER — LEVALBUTEROL INHALATION SOLUTION 1.25 MG/3ML
1 SOLUTION RESPIRATORY (INHALATION) EVERY 4 HOURS PRN
COMMUNITY

## 2025-02-10 RX ORDER — IPRATROPIUM BROMIDE AND ALBUTEROL SULFATE 2.5; .5 MG/3ML; MG/3ML
3 SOLUTION RESPIRATORY (INHALATION) EVERY 4 HOURS PRN
COMMUNITY
End: 2025-02-10 | Stop reason: SDUPTHER

## 2025-02-10 RX ORDER — ATORVASTATIN CALCIUM 40 MG/1
40 TABLET, FILM COATED ORAL DAILY
COMMUNITY

## 2025-02-10 NOTE — PROGRESS NOTES
Pulmonary Office Follow-up    Subjective     Lisy Almeida is seen today at the office for   Chief Complaint   Patient presents with    COPD     Hospital follow up    Shortness of Breath    Cough    pulmonary embolisim    Pneumonia         HPI  Lisy Almeida is a 59 y.o. female with a PMH significant for COPD and pulmonary embolism presents for follow-up patient has been hospitalized with COVID and pulmonary embolism and was started on Eliquis she is on Trelegy daily and has quit smoking she complains of dyspnea on exertion and cough with scant mucoid expectoration      Tobacco use history:  Former smoker      There is no problem list on file for this patient.      Review of Systems  Review of Systems   Respiratory:  Positive for shortness of breath.    All other systems reviewed and are negative.    As described in the HPI. Otherwise, remainder of ROS (14 systems) were negative.    Medications, Allergies, Social, and Family Histories reviewed as per EMR.    Result Review :            Objective     Vitals:    02/10/25 0851   BP: 109/55   Pulse: 68   Resp: 18   Temp: 98.4 °F (36.9 °C)   SpO2: 97%         02/10/25  0851   Weight: 83 kg (183 lb)       Physical Exam  Vitals and nursing note reviewed.   Constitutional:       Appearance: She is obese.   HENT:      Head: Normocephalic and atraumatic.      Nose: Nose normal.      Mouth/Throat:      Mouth: Mucous membranes are moist.      Pharynx: Oropharynx is clear.   Eyes:      Extraocular Movements: Extraocular movements intact.      Conjunctiva/sclera: Conjunctivae normal.      Pupils: Pupils are equal, round, and reactive to light.   Cardiovascular:      Rate and Rhythm: Normal rate and regular rhythm.      Pulses: Normal pulses.      Heart sounds: Normal heart sounds.   Pulmonary:      Effort: Pulmonary effort is normal.      Breath sounds: Normal breath sounds.   Musculoskeletal:         General: Normal range of motion.      Cervical back: Normal range of  motion and neck supple.   Skin:     General: Skin is warm.      Capillary Refill: Capillary refill takes 2 to 3 seconds.   Neurological:      Mental Status: She is alert and oriented to person, place, and time.   Psychiatric:         Mood and Affect: Mood normal.         Behavior: Behavior normal.         No radiology results for the last 90 days.     Assessment & Plan     Diagnoses and all orders for this visit:    1. Mixed simple and mucopurulent chronic bronchitis (Primary)  -     Complete PFT - Pre & Post Bronchodilator; Future    2. History of pulmonary embolism  -     Complete PFT - Pre & Post Bronchodilator; Future    3. Tobacco abuse  -     Complete PFT - Pre & Post Bronchodilator; Future    Other orders  -     ipratropium-albuterol (DUO-NEB) 0.5-2.5 mg/3 ml nebulizer; Take 3 mL by nebulization Every 4 (Four) Hours As Needed for Wheezing.  Dispense: 360 mL; Refill: 3  -     apixaban (ELIQUIS) 5 MG tablet tablet; Take 1 tablet by mouth 2 (Two) Times a Day.  Dispense: 60 tablet; Refill: 5         Discussion/ Recommendations:   Will order PFT for follow-up of COPD  Continue Eliquis  Continue Trelegy daily  Advised to reduce weight  Congratulated on quitting smoking  Refill DuoNebs  Vaccinations discussed and recommended             Return in about 3 months (around 5/10/2025).          This document has been electronically signed by Marcos Brown MD on February 10, 2025 09:00 EST

## 2025-02-14 ENCOUNTER — PATIENT MESSAGE (OUTPATIENT)
Dept: CARDIOLOGY | Facility: CLINIC | Age: 60
End: 2025-02-14
Payer: COMMERCIAL

## 2025-02-14 RX ORDER — VALSARTAN 40 MG/1
40 TABLET ORAL DAILY
Qty: 90 TABLET | Refills: 3 | Status: SHIPPED | OUTPATIENT
Start: 2025-02-14 | End: 2025-02-27

## 2025-02-14 NOTE — TELEPHONE ENCOUNTER
Since bp is still low, recommend stopping entresto and switch to valsartan 40 mg daily  Continue to monitor BP twice daily for the next week and send in log for review

## 2025-02-24 RX ORDER — ROFLUMILAST 250 UG/1
250 TABLET ORAL DAILY
Qty: 90 TABLET | Refills: 0 | Status: SHIPPED | OUTPATIENT
Start: 2025-02-24

## 2025-02-25 ENCOUNTER — TELEPHONE (OUTPATIENT)
Dept: CARDIOLOGY | Facility: CLINIC | Age: 60
End: 2025-02-25
Payer: COMMERCIAL

## 2025-02-25 DIAGNOSIS — I50.9 CONGESTIVE HEART FAILURE, UNSPECIFIED HF CHRONICITY, UNSPECIFIED HEART FAILURE TYPE: Primary | ICD-10-CM

## 2025-02-25 NOTE — TELEPHONE ENCOUNTER
Blood pressures have improved since discontinuation of valsartan  Replace lasix with spironolactone 25 mg daily. Stop potassium if she had been taking it with lasix  Continue metoprolol as prescribed.   Encourage to reduce fluid intake to no more than 68 ounces/day. Restrict sodium intake to no more than 2000 mg/day. Really look at nutrition labels closely to achieve this. Wear ACE wrap/compression socks for edema. Perform daily weight and notify office of weight gain 5lb or more in 5 day span. Obtain chest xray,  BMP and BNP in 1 week.

## 2025-02-25 NOTE — TELEPHONE ENCOUNTER
Per Brianna Pastrana, APRN:    Blood pressures have improved since discontinuation of valsartan  Replace lasix with spironolactone 25 mg daily. Stop potassium if she had been taking it with lasix  Continue metoprolol as prescribed.   Encourage to reduce fluid intake to no more than 68 ounces/day. Restrict sodium intake to no more than 2000 mg/day. Really look at nutrition labels closely to achieve this. Wear ACE wrap/compression socks for edema. Perform daily weight and notify office of weight gain 5lb or more in 5 day span. Obtain chest xray,  BMP and BNP in 1 week.        Attempted to call patient. No answer. VM left with return call requested.

## 2025-02-27 RX ORDER — SPIRONOLACTONE 25 MG/1
25 TABLET ORAL DAILY
Qty: 90 TABLET | Refills: 3 | Status: SHIPPED | OUTPATIENT
Start: 2025-02-27

## 2025-02-27 NOTE — TELEPHONE ENCOUNTER
Attempted to call patient. No answer. VM left with return call requested. Tengaged message sent.

## 2025-03-06 ENCOUNTER — TELEPHONE (OUTPATIENT)
Dept: CARDIOLOGY | Facility: CLINIC | Age: 60
End: 2025-03-06
Payer: COMMERCIAL

## 2025-03-07 NOTE — TELEPHONE ENCOUNTER
----- Message from Brianna Pastrana sent at 3/6/2025  2:27 PM EST -----  Chest xray shows small area of atelectasis, find out if symptoms have improved with addition of spironolactone. Labs appear stable  ----- Message -----  From: Evelyn Koch MA  Sent: 3/6/2025   2:05 PM EST  To: DEBORAH Rubi    Please advise.  ----- Message -----  From: Traci Terry RegSched Rep  Sent: 3/6/2025   1:54 PM EST  To: [facility]  
Attempted to call patient. No answer. VM left with return call requested.     
Patient notified of results via MyChart. See previous Mychart encounter.  
4

## 2025-03-17 RX ORDER — FLUTICASONE FUROATE, UMECLIDINIUM BROMIDE AND VILANTEROL TRIFENATATE 200; 62.5; 25 UG/1; UG/1; UG/1
1 POWDER RESPIRATORY (INHALATION) DAILY
Qty: 180 EACH | Refills: 0 | Status: SHIPPED | OUTPATIENT
Start: 2025-03-17

## 2025-03-20 DIAGNOSIS — I50.9 CONGESTIVE HEART FAILURE, UNSPECIFIED HF CHRONICITY, UNSPECIFIED HEART FAILURE TYPE: ICD-10-CM

## 2025-03-21 ENCOUNTER — RESULTS FOLLOW-UP (OUTPATIENT)
Dept: CARDIOLOGY | Facility: CLINIC | Age: 60
End: 2025-03-21
Payer: COMMERCIAL

## 2025-03-21 DIAGNOSIS — I50.9 CONGESTIVE HEART FAILURE, UNSPECIFIED HF CHRONICITY, UNSPECIFIED HEART FAILURE TYPE: Primary | ICD-10-CM

## 2025-03-24 RX ORDER — POTASSIUM CHLORIDE 750 MG/1
20 TABLET, EXTENDED RELEASE ORAL DAILY
Qty: 180 TABLET | Refills: 3 | Status: SHIPPED | OUTPATIENT
Start: 2025-03-24

## 2025-03-24 RX ORDER — POTASSIUM CHLORIDE 750 MG/1
10 TABLET, EXTENDED RELEASE ORAL DAILY
Qty: 90 TABLET | Refills: 3 | Status: SHIPPED | OUTPATIENT
Start: 2025-03-24 | End: 2025-03-24

## 2025-03-24 NOTE — TELEPHONE ENCOUNTER
Attempted to call patient. No answer. VM left with return call requested.   Hydrelis message sent.

## 2025-03-25 ENCOUNTER — TELEPHONE (OUTPATIENT)
Dept: CARDIOLOGY | Facility: CLINIC | Age: 60
End: 2025-03-25
Payer: COMMERCIAL

## 2025-03-25 NOTE — TELEPHONE ENCOUNTER
[facility] received a fax that requires your attention. The document has been indexed to the patient’s chart for your review.      Reason for sending: EXTERNAL MEDICAL RECORD NOTIFICATION     Documents Description: SIGN/RETURN-[facility]-3.25.25    Name of Sender: [facility]     Date Indexed: 3.25.25    CXR 2VW NEEDS SIGNED.

## 2025-03-27 NOTE — PROGRESS NOTES
[facility]  Cardiology progress Note    Patient Name: Lisy Almeida  : 1965    CHIEF COMPLAINT  Shortness of breath        Subjective   Subjective     HISTORY OF PRESENT ILLNESS    Lisy Almeida is a 59 y.o. female with history of shortness of breath.  No chest pain.  Shortness of breath on minimal exertion.    REVIEW OF SYSTEMS    Constitutional:    No fever, no weight loss  Skin:     No rash  Otolaryngeal:    No difficulty swallowing  Cardiovascular: See HPI.  Pulmonary:    No cough, no sputum production    Personal History     Social History:    reports that she quit smoking about 2 months ago. Her smoking use included cigarettes. She started smoking about 44 years ago. She has a 11.1 pack-year smoking history. She has never used smokeless tobacco. She reports that she does not drink alcohol and does not use drugs.    Home Medications:  Current Outpatient Medications on File Prior to Visit   Medication Sig    albuterol sulfate  (90 Base) MCG/ACT inhaler Inhale 2 puffs Every 4 (Four) Hours As Needed.    apixaban (ELIQUIS) 5 MG tablet tablet Take 1 tablet by mouth 2 (Two) Times a Day.    atorvastatin (LIPITOR) 40 MG tablet Take 1 tablet by mouth Daily.    bumetanide (BUMEX) 2 MG tablet Take 1 tablet by mouth Daily.    ipratropium-albuterol (DUO-NEB) 0.5-2.5 mg/3 ml nebulizer Take 3 mL by nebulization Every 4 (Four) Hours As Needed for Wheezing.    levalbuterol (XOPENEX) 1.25 MG/3ML nebulizer solution Take 1 ampule by nebulization Every 4 (Four) Hours As Needed for Wheezing.    metFORMIN (GLUCOPHAGE) 500 MG tablet Take 1 tablet by mouth 2 (Two) Times a Day With Meals.    metoprolol tartrate (LOPRESSOR) 25 MG tablet Take 1 tablet by mouth 2 (Two) Times a Day.    montelukast (SINGULAIR) 10 MG tablet Take 1 tablet by mouth Every Night.    potassium chloride 10 MEQ CR tablet Take 2 tablets by mouth Daily.    roflumilast (DALIRESP) 250 MCG tablet tablet Take 1 tablet by mouth once daily     spironolactone (ALDACTONE) 25 MG tablet Take 1 tablet by mouth Daily.    Trelegy Ellipta 200-62.5-25 MCG/ACT inhaler INHALE 1 PUFF ONCE DAILY    [DISCONTINUED] clopidogrel (PLAVIX) 75 MG tablet Take 1 tablet by mouth Daily.    [DISCONTINUED] MAGNESIUM PO Take 250 mg by mouth. (Patient not taking: Reported on 3/31/2025)     No current facility-administered medications on file prior to visit.       Past Medical History:   Diagnosis Date    Abnormal ECG ?    Pvc    Asthma     CHF (congestive heart failure) 1/22    Chronic bronchitis ?    COPD (chronic obstructive pulmonary disease)     Heart valve disease     Hyperlipidemia     Lung nodule ?    Mitral valve prolapse     Pneumonia 1/3/25    Pulmonary embolism 1/24       Allergies:  Allergies   Allergen Reactions    Codeine Shortness Of Breath    Farxiga [Dapagliflozin] Other (See Comments)     Frequent Thrush    Levofloxacin Nausea And Vomiting       Objective    Objective       Vitals:   Heart Rate:  [76] 76  BP: (110)/(52) 110/52  Body mass index is 33.3 kg/m².     PHYSICAL EXAM:    General Appearance:   well developed  well nourished  HENT:   oropharynx moist  lips not cyanotic  Neck:  thyroid not enlarged  supple  Respiratory:  no respiratory distress  normal breath sounds  no rales  Cardiovascular:  no jugular venous distention  regular rhythm  apical impulse normal  S1 normal, S2 normal  no S3, no S4   no murmur  no rub, no thrill  carotid pulses normal; no bruit  pedal pulses normal  lower extremity edema: none    Skin:   warm, dry  Psychiatric:  judgement and insight appropriate  normal mood and affect        Result Review:  I have personally reviewed the available results from  [x]  Laboratory  [x]  EKG  [x]  Cardiology  [x]  Medications  [x]  Old records  []  Other:     Procedures    Results for orders placed in visit on 07/12/24    Adult Transthoracic Echo Complete W/ Cont if Necessary Per Protocol     Impression/Plan:  1.  Shortness of breath/chronic  diastolic heart failure: Recent echo shows an ejection fraction of 55%.  Continue Toprol-XL 25 mg twice a day.   Continue Bumex 2 mg once a day.  Normal recent BNP.  Monitor BMP.  2.  COPD: Continue current bronchodilators.  3.  Positive nicotine use: Smoking cessation discussed with patient  4.  Mixed hyperlipidemia: Continue Lipitor 40 mg once a day.  Monitor lipid and hepatic profile.  5.  Palpitations/PVCs: .  Continue Toprol-XL 25 mg twice a day.  6.  Pulmonary embolism: Continue Eliquis 5 mg twice a day.  Reviewed her reports from the hospital.              Otto Stevens MD   03/31/25   12:41 EDT

## 2025-03-31 ENCOUNTER — OFFICE VISIT (OUTPATIENT)
Dept: CARDIOLOGY | Facility: CLINIC | Age: 60
End: 2025-03-31
Payer: MEDICAID

## 2025-03-31 VITALS
HEIGHT: 63 IN | WEIGHT: 188 LBS | SYSTOLIC BLOOD PRESSURE: 110 MMHG | BODY MASS INDEX: 33.31 KG/M2 | DIASTOLIC BLOOD PRESSURE: 52 MMHG | HEART RATE: 76 BPM

## 2025-03-31 DIAGNOSIS — R00.2 PALPITATIONS: ICD-10-CM

## 2025-03-31 DIAGNOSIS — E78.2 HYPERLIPEMIA, MIXED: ICD-10-CM

## 2025-03-31 DIAGNOSIS — I50.32 DIASTOLIC CHF, CHRONIC: Primary | ICD-10-CM

## 2025-03-31 DIAGNOSIS — R06.02 SHORTNESS OF BREATH: ICD-10-CM

## 2025-03-31 PROCEDURE — 99214 OFFICE O/P EST MOD 30 MIN: CPT | Performed by: SPECIALIST

## 2025-04-01 DIAGNOSIS — I50.9 CONGESTIVE HEART FAILURE, UNSPECIFIED HF CHRONICITY, UNSPECIFIED HEART FAILURE TYPE: ICD-10-CM

## 2025-04-08 ENCOUNTER — TELEPHONE (OUTPATIENT)
Dept: CARDIOLOGY | Facility: CLINIC | Age: 60
End: 2025-04-08
Payer: MEDICAID

## 2025-04-16 RX ORDER — FLUTICASONE FUROATE, UMECLIDINIUM BROMIDE AND VILANTEROL TRIFENATATE 200; 62.5; 25 UG/1; UG/1; UG/1
1 POWDER RESPIRATORY (INHALATION) DAILY
Qty: 180 EACH | Refills: 0 | Status: SHIPPED | OUTPATIENT
Start: 2025-04-16

## 2025-04-17 ENCOUNTER — HOSPITAL ENCOUNTER (OUTPATIENT)
Dept: RESPIRATORY THERAPY | Facility: HOSPITAL | Age: 60
Discharge: HOME | End: 2025-04-17
Payer: COMMERCIAL

## 2025-04-17 DIAGNOSIS — Z86.711 HISTORY OF PULMONARY EMBOLISM: ICD-10-CM

## 2025-04-17 DIAGNOSIS — J41.8 MIXED SIMPLE AND MUCOPURULENT CHRONIC BRONCHITIS: ICD-10-CM

## 2025-04-17 DIAGNOSIS — Z72.0 TOBACCO ABUSE: ICD-10-CM

## 2025-04-17 PROCEDURE — 94729 DIFFUSING CAPACITY: CPT

## 2025-04-17 PROCEDURE — 94726 PLETHYSMOGRAPHY LUNG VOLUMES: CPT

## 2025-04-17 PROCEDURE — 94060 EVALUATION OF WHEEZING: CPT

## 2025-04-17 RX ORDER — ALBUTEROL SULFATE 0.83 MG/ML
2.5 SOLUTION RESPIRATORY (INHALATION) ONCE
Status: COMPLETED | OUTPATIENT
Start: 2025-04-17 | End: 2025-04-17

## 2025-04-17 RX ADMIN — ALBUTEROL SULFATE 2.5 MG: 2.5 SOLUTION RESPIRATORY (INHALATION) at 09:17

## 2025-04-25 ENCOUNTER — TELEPHONE (OUTPATIENT)
Dept: PULMONOLOGY | Facility: CLINIC | Age: 60
End: 2025-04-25
Payer: COMMERCIAL

## 2025-04-25 NOTE — TELEPHONE ENCOUNTER
Please advise. Patient would like PFT results    Spoke with patient. Test not resulted. Will notify patient when resulted

## 2025-05-23 RX ORDER — ROFLUMILAST 250 UG/1
250 TABLET ORAL DAILY
Qty: 90 TABLET | Refills: 3 | Status: SHIPPED | OUTPATIENT
Start: 2025-05-23

## 2025-05-27 ENCOUNTER — HOSPITAL ENCOUNTER (OUTPATIENT)
Facility: HOSPITAL | Age: 60
Discharge: HOME | End: 2025-05-27
Admitting: INTERNAL MEDICINE
Payer: COMMERCIAL

## 2025-05-27 ENCOUNTER — OFFICE VISIT (OUTPATIENT)
Dept: PULMONOLOGY | Facility: CLINIC | Age: 60
End: 2025-05-27
Payer: COMMERCIAL

## 2025-05-27 VITALS
RESPIRATION RATE: 18 BRPM | BODY MASS INDEX: 30.12 KG/M2 | WEIGHT: 170 LBS | HEART RATE: 95 BPM | DIASTOLIC BLOOD PRESSURE: 82 MMHG | SYSTOLIC BLOOD PRESSURE: 108 MMHG | OXYGEN SATURATION: 98 % | HEIGHT: 63 IN

## 2025-05-27 DIAGNOSIS — Z72.0 TOBACCO ABUSE: ICD-10-CM

## 2025-05-27 DIAGNOSIS — J44.1 COPD WITH ACUTE EXACERBATION: Primary | ICD-10-CM

## 2025-05-27 DIAGNOSIS — Z86.711 HISTORY OF PULMONARY EMBOLISM: ICD-10-CM

## 2025-05-27 DIAGNOSIS — J44.1 COPD WITH ACUTE EXACERBATION: ICD-10-CM

## 2025-05-27 PROCEDURE — 1160F RVW MEDS BY RX/DR IN RCRD: CPT | Performed by: INTERNAL MEDICINE

## 2025-05-27 PROCEDURE — 99214 OFFICE O/P EST MOD 30 MIN: CPT | Performed by: INTERNAL MEDICINE

## 2025-05-27 PROCEDURE — 1159F MED LIST DOCD IN RCRD: CPT | Performed by: INTERNAL MEDICINE

## 2025-05-27 PROCEDURE — 71046 X-RAY EXAM CHEST 2 VIEWS: CPT

## 2025-05-27 RX ORDER — PREDNISONE 10 MG/1
TABLET ORAL
Qty: 31 TABLET | Refills: 0 | Status: SHIPPED | OUTPATIENT
Start: 2025-05-27

## 2025-05-27 RX ORDER — ARFORMOTEROL TARTRATE 15 UG/2ML
SOLUTION RESPIRATORY (INHALATION)
COMMUNITY
Start: 2025-05-02

## 2025-05-27 RX ORDER — LEVALBUTEROL TARTRATE 45 UG/1
2 AEROSOL, METERED ORAL EVERY 4 HOURS PRN
COMMUNITY
Start: 2025-05-01

## 2025-05-27 RX ORDER — AMOXICILLIN 500 MG/1
500 CAPSULE ORAL 3 TIMES DAILY
Qty: 21 CAPSULE | Refills: 0 | Status: SHIPPED | OUTPATIENT
Start: 2025-05-27 | End: 2025-06-03

## 2025-05-27 NOTE — PROGRESS NOTES
Pulmonary Office Follow-up    Subjective     Lisy Almeida is seen today at the office for   Chief Complaint   Patient presents with    COPD    Follow-up     3 Month     Results     PFT     Shortness of Breath     Worsening, with ambulation     Cough     Productive, cream/ yellow     Weight Loss     Patient states she has lost 7 pounds within a week          HPI  Lisy Almeida is a 59 y.o. female with a PMH significant for COPD and tobacco abuse with history of pulmonary embolism presents for follow-up patient has been having worsening breathlessness along with wheeze cough and thick mucopurulent expectoration with chest congestion patient denies any fever or hemoptysis she has already quit smoking patient is on Trelegy along with neb treatments at home      Tobacco use history:  Former smoker      There is no problem list on file for this patient.      Review of Systems  Review of Systems   Respiratory:  Positive for cough, shortness of breath and wheezing.    All other systems reviewed and are negative.    As described in the HPI. Otherwise, remainder of ROS (14 systems) were negative.    Medications, Allergies, Social, and Family Histories reviewed as per EMR.    Result Review :            Objective     Vitals:    05/27/25 0811   BP: 108/82   Pulse: 95   Resp: 18   SpO2: 98%         05/27/25  0811   Weight: 77.1 kg (170 lb)       Physical Exam  Vitals and nursing note reviewed.   Constitutional:       Appearance: Normal appearance.   HENT:      Head: Normocephalic and atraumatic.      Nose: Nose normal.      Mouth/Throat:      Pharynx: Oropharynx is clear.   Eyes:      Extraocular Movements: Extraocular movements intact.      Conjunctiva/sclera: Conjunctivae normal.      Pupils: Pupils are equal, round, and reactive to light.   Cardiovascular:      Rate and Rhythm: Normal rate and regular rhythm.      Pulses: Normal pulses.      Heart sounds: Normal heart sounds.   Pulmonary:      Effort: Pulmonary  effort is normal.      Breath sounds: Wheezing and rhonchi present.   Musculoskeletal:      Cervical back: Normal range of motion and neck supple.   Skin:     General: Skin is warm.      Capillary Refill: Capillary refill takes 2 to 3 seconds.   Neurological:      Mental Status: She is alert and oriented to person, place, and time.   Psychiatric:         Mood and Affect: Mood normal.         Behavior: Behavior normal.         No radiology results for the last 90 days.     Assessment & Plan     Diagnoses and all orders for this visit:    1. COPD with acute exacerbation (Primary)  -     XR Chest 2 View; Future    2. Tobacco abuse  -     XR Chest 2 View; Future    3. History of pulmonary embolism  -     XR Chest 2 View; Future    Other orders  -     amoxicillin (AMOXIL) 500 MG capsule; Take 1 capsule by mouth 3 (Three) Times a Day for 7 days.  Dispense: 21 capsule; Refill: 0  -     predniSONE (DELTASONE) 10 MG tablet; Take 4 tabs daily x 3 days, then take 3 tabs daily x 3 days, then take 2 tabs daily x 3 days, then take 1 tab daily x 3 days  Dispense: 31 tablet; Refill: 0         Discussion/ Recommendations:   We will order chest x-ray  PFTs reviewed show moderately severe airways obstruction  Patient has already quit smoking  Continue Trelegy  Will start her on Amoxil and prednisone for 1 week  Continue Trelegy along with neb treatments as needed  Advised to reduce weight her BMI is 30  Vaccinations discussed and recommended    BMI is >= 30 and <35. (Class 1 Obesity). The following options were offered after discussion;: exercise counseling/recommendations        Return in about 3 months (around 8/27/2025).          This document has been electronically signed by Marcos Brown MD on May 27, 2025 08:24 EDT

## 2025-05-29 ENCOUNTER — MEDICATION THERAPY MANAGEMENT (OUTPATIENT)
Dept: PULMONOLOGY | Facility: CLINIC | Age: 60
End: 2025-05-29
Payer: COMMERCIAL

## 2025-05-29 ENCOUNTER — TELEPHONE (OUTPATIENT)
Dept: PULMONOLOGY | Facility: CLINIC | Age: 60
End: 2025-05-29
Payer: COMMERCIAL

## 2025-05-29 NOTE — TELEPHONE ENCOUNTER
Patients Daliresp was denied due to the FEV1 level not being less than 50%. [place] appeal sent to plan

## 2025-06-27 ENCOUNTER — TELEPHONE (OUTPATIENT)
Dept: PULMONOLOGY | Facility: CLINIC | Age: 60
End: 2025-06-27
Payer: COMMERCIAL

## 2025-06-27 NOTE — TELEPHONE ENCOUNTER
You have not seen this patient yet. She has an appointment in August. Daliresp has been denied. I appealed it but still denied due to FEV1 not being under 50%. Would you like to change her to something else?

## 2025-07-10 ENCOUNTER — TELEPHONE (OUTPATIENT)
Dept: PULMONOLOGY | Facility: CLINIC | Age: 60
End: 2025-07-10

## 2025-07-18 NOTE — PROGRESS NOTES
Primary Care Provider  Gina Murcia APRN   Referring Provider  No ref. provider found      Patient Complaint  COPD, Follow-up (3 month), Shortness of Breath (Using inhalers more frequently, low O2 readings at night ), and Cough      Subjective          Lisy Almeida presents to [facility]      Lisy Almeida is a 59 y.o. female patient of Dr. Brown with COPD, and tobacco use in remission, here for 5 month follow up.    History of Present Illness    Patient states she is doing okay since her last visit, here today to go over updated PFTs and chest x-ray.  Her chest x-ray showed no active pulmonary disease.  Her pulmonary function test showed the presence of obstructive defect, hyperinflation and air trapping, consistent with COPD.  She has had more shortness of breath, cough, and has been using her inhalers more frequently the past couple of months.  She has been on antibiotics and steroids recently for COPD exacerbation.  Patient has been using several different respiratory medications including various inhalers and nebulizer treatments.  She has been dealing with chronic thrush as well despite rinsing well after each use.  She denies any fevers or chills.  She is a former smoker, quit 6 months ago, 11 pack years.  Patient denies any hemoptysis, swollen lymph nodes, weight loss, or night sweats.  Patient is usually able to perform ADLs with minor modifications.  I have personally reviewed the review of systems, past family, social, medical and surgical histories; and agree with their findings.    Pulmonary Meds  Trelegy 200  Xopenex inhaler  Xopenex nebs  Daliresp 250 mcg  Singulair    Pulmonary equipment  None        Family History   Problem Relation Age of Onset    Heart disease Mother     Hypertension Mother     Diabetes Mother     Heart failure Mother     Hypertension Father     Asthma Father     Cancer Father     Cancer Maternal  Grandmother     Cancer Sister     Cancer Brother         Hi bone    Diabetes Sister         Social History     Socioeconomic History    Marital status: Single   Tobacco Use    Smoking status: Former     Current packs/day: 0.00     Average packs/day: 0.3 packs/day for 44.0 years (11.0 ttl pk-yrs)     Types: Cigarettes     Start date: 1981     Quit date: 1/3/2025     Years since quittin.5     Passive exposure: Past    Smokeless tobacco: Never    Tobacco comments:     Quit several times thru the years   Vaping Use    Vaping status: Never Used   Substance and Sexual Activity    Alcohol use: Never    Drug use: Never    Sexual activity: Not Currently     Partners: Male        Past Medical History:   Diagnosis Date    Abnormal ECG ?    Pvc    Asthma     CHF (congestive heart failure)     Chronic bronchitis ?    COPD (chronic obstructive pulmonary disease)     Heart valve disease     Hyperlipidemia     Lung nodule ?    Mitral valve prolapse     Pneumonia 1/3/25    Pulmonary embolism         Immunization History   Administered Date(s) Administered    COVID-19 (MODERNA) 1st,2nd,3rd Dose Monovalent 2021, 2021    COVID-19 (MODERNA) Monovalent Original Booster 10/29/2021    Hep A, 2 Dose 2018, 2019    Hepatitis A 2018, 2019    Tdap 2023, 2024       Allergies   Allergen Reactions    Codeine Shortness Of Breath    Farxiga [Dapagliflozin] Other (See Comments)     Frequent Thrush    Levofloxacin Nausea And Vomiting          Current Outpatient Medications:     albuterol sulfate  (90 Base) MCG/ACT inhaler, Inhale 2 puffs Every 4 (Four) Hours As Needed., Disp: , Rfl:     apixaban (ELIQUIS) 5 MG tablet tablet, Take 1 tablet by mouth 2 (Two) Times a Day., Disp: 60 tablet, Rfl: 5    arformoterol (BROVANA) 15 MCG/2ML nebulizer solution, , Disp: , Rfl:     atorvastatin (LIPITOR) 40 MG tablet, Take 1 tablet by mouth Daily., Disp: , Rfl:     baclofen (LIORESAL) 10 MG  tablet, Take 1 tablet by mouth 3 (Three) Times a Day As Needed. for muscle spams, Disp: , Rfl:     bumetanide (BUMEX) 2 MG tablet, Take 1 tablet by mouth Daily., Disp: 90 tablet, Rfl: 3    Diclofenac Sodium (VOLTAREN) 1 % gel gel, APPLY 3 TIMES A DAY TO BILATERAL KNEES, Disp: , Rfl:     ipratropium-albuterol (DUO-NEB) 0.5-2.5 mg/3 ml nebulizer, Take 3 mL by nebulization Every 4 (Four) Hours As Needed for Wheezing., Disp: 360 mL, Rfl: 3    levalbuterol (XOPENEX HFA) 45 MCG/ACT inhaler, Inhale 2 puffs Every 4 (Four) Hours As Needed for Shortness of Air., Disp: , Rfl:     levalbuterol (XOPENEX) 1.25 MG/3ML nebulizer solution, Take 1 ampule by nebulization Every 4 (Four) Hours As Needed for Wheezing., Disp: , Rfl:     metFORMIN (GLUCOPHAGE) 500 MG tablet, Take 1 tablet by mouth 2 (Two) Times a Day With Meals., Disp: , Rfl:     metoprolol tartrate (LOPRESSOR) 25 MG tablet, Take 1 tablet by mouth 2 (Two) Times a Day., Disp: , Rfl:     montelukast (SINGULAIR) 10 MG tablet, Take 1 tablet by mouth Every Night., Disp: 30 tablet, Rfl: 11    potassium chloride 10 MEQ CR tablet, Take 2 tablets by mouth Daily., Disp: 180 tablet, Rfl: 3    roflumilast (DALIRESP) 250 MCG tablet tablet, Take 1 tablet by mouth once daily, Disp: 90 tablet, Rfl: 3    spironolactone (ALDACTONE) 25 MG tablet, Take 1 tablet by mouth Daily., Disp: 90 tablet, Rfl: 3    budesonide (Pulmicort) 0.5 MG/2ML nebulizer solution, Take 2 mL by nebulization 2 (Two) Times a Day., Disp: 120 mL, Rfl: 5    ketorolac (ACULAR) 0.4 % solution, , Disp: , Rfl:     ketorolac (ACULAR) 0.5 % ophthalmic solution, , Disp: , Rfl:     Miconazole 50 MG tablet, Take 1 each by mouth Daily., Disp: 28 tablet, Rfl: 3    revefenacin (Yupelri) 175 MCG/3ML nebulizer solution, Take 3 mL by nebulization Daily., Disp: 90 mL, Rfl: 5    sucralfate (CARAFATE) 1 g tablet, TAKE 1 TABLET BY MOUTH BEFORE MEAL(S) AND AT BEDTIME (Patient not taking: Reported on 7/28/2025), Disp: , Rfl:      Objective  "    Vital Signs:   /77 (BP Location: Left arm, Patient Position: Sitting, Cuff Size: Adult)   Pulse 100   Temp 98.6 °F (37 °C) (Tympanic)   Resp 18   Ht 160 cm (63\")   Wt 82.7 kg (182 lb 6.4 oz)   SpO2 94% Comment: room air  BMI 32.31 kg/m²     Physical Exam  Constitutional:       General: She is not in acute distress.     Appearance: Normal appearance. She is normal weight. She is not ill-appearing.   HENT:      Right Ear: Tympanic membrane and ear canal normal.      Left Ear: Tympanic membrane and ear canal normal.      Nose: Nose normal.      Mouth/Throat:      Mouth: Mucous membranes are moist.      Pharynx: Oropharynx is clear.   Eyes:      Extraocular Movements: Extraocular movements intact.      Conjunctiva/sclera: Conjunctivae normal.      Pupils: Pupils are equal, round, and reactive to light.   Cardiovascular:      Rate and Rhythm: Normal rate and regular rhythm.      Pulses: Normal pulses.      Heart sounds: Normal heart sounds.   Pulmonary:      Effort: Pulmonary effort is normal. No respiratory distress.      Breath sounds: Normal breath sounds. No stridor. No wheezing, rhonchi or rales.   Abdominal:      General: Bowel sounds are normal.      Palpations: Abdomen is soft.   Musculoskeletal:         General: No swelling. Normal range of motion.      Cervical back: Normal range of motion and neck supple.      Right lower leg: No edema.      Left lower leg: No edema.   Skin:     General: Skin is warm and dry.   Neurological:      General: No focal deficit present.      Mental Status: She is alert and oriented to person, place, and time.      Motor: No weakness.   Psychiatric:         Mood and Affect: Mood normal.         Behavior: Behavior normal.        Result Review :   I have personally reviewed patient's labs and images.  I also reviewed Dr. Brown's last progress note 2/10/2025.    -Alpha-1 antitrypsin genotype MS, quantity not sufficient to determine level  -PFTs 12/12/2022 showed mild " obstructive defect without significant response to bronchodilator.  Slight air trapping present, DLCO normal.  -Updated PFTs 4/17/2025 showed obstructive defect, FEV1 86% of predicted, no significant response to bronchodilator.  Air trapping and hyperinflation present, DLCO reduced 68% of predicted.  -CT chest 6/13/2024 showed no acute cardiopulmonary disease.  No suspicious pulmonary nodules or masses.  -CXR 5/27/2025 showed no active disease, lungs hyperexpanded consistent with emphysema       Diagnoses and all orders for this visit:    1. Chronic obstructive pulmonary disease, unspecified COPD type (Primary)  -     Overnight Sleep Oximetry Study; Future  -     budesonide (Pulmicort) 0.5 MG/2ML nebulizer solution; Take 2 mL by nebulization 2 (Two) Times a Day.  Dispense: 120 mL; Refill: 5  -     revefenacin (Yupelri) 175 MCG/3ML nebulizer solution; Take 3 mL by nebulization Daily.  Dispense: 90 mL; Refill: 5    2. Dyspnea, unspecified type  -     Overnight Sleep Oximetry Study; Future  -     budesonide (Pulmicort) 0.5 MG/2ML nebulizer solution; Take 2 mL by nebulization 2 (Two) Times a Day.  Dispense: 120 mL; Refill: 5  -     revefenacin (Yupelri) 175 MCG/3ML nebulizer solution; Take 3 mL by nebulization Daily.  Dispense: 90 mL; Refill: 5    3. History of pulmonary embolism    4. Tobacco abuse, in remission    5. Oral thrush  -     Miconazole 50 MG tablet; Take 1 each by mouth Daily.  Dispense: 28 tablet; Refill: 3      Assessment & Plan    -Overnight oximetry study ordered today, patient declines in-lab sleep study at this time  -We will switch patient to straight neb treatments.  She already has Romana at home, which she was instructed to continue.  She should begin mixing Pulmicort with Brovana and using twice daily.  Begin using Yupelri by itself once a day.  Rinse mouth well after using all breathing medications.  Patient instructed to discontinue Trelegy once she begins using all nebulizer  treatments.  -Continue using Xopenex inhaler and Xopenex treatments as needed  -Patient does not qualify for Daliresp based on PFT results  -Continue taking Singulair for allergies  -Continue following up with Dr. Stevens with cardiology for management of tachyarrhythmias  -Follow up in 2 months to check in regarding medication changes    Smoking status: Reviewed  Vaccination status: Patient declines vaccines.  Patient is advised to continue to follow CDC recommendations such as social distancing wearing a mask and washing hands for at least 20 seconds.  Medications personally reviewed    Follow Up   No follow-ups on file.  Patient was given instructions and counseling regarding her condition or for health maintenance advice. Please see specific information pulled into the AVS if appropriate.

## 2025-07-28 ENCOUNTER — OFFICE VISIT (OUTPATIENT)
Dept: PULMONOLOGY | Facility: CLINIC | Age: 60
End: 2025-07-28
Payer: COMMERCIAL

## 2025-07-28 VITALS
WEIGHT: 182.4 LBS | SYSTOLIC BLOOD PRESSURE: 119 MMHG | HEART RATE: 100 BPM | OXYGEN SATURATION: 94 % | DIASTOLIC BLOOD PRESSURE: 77 MMHG | RESPIRATION RATE: 18 BRPM | BODY MASS INDEX: 32.32 KG/M2 | TEMPERATURE: 98.6 F | HEIGHT: 63 IN

## 2025-07-28 DIAGNOSIS — B37.0 ORAL THRUSH: ICD-10-CM

## 2025-07-28 DIAGNOSIS — R06.00 DYSPNEA, UNSPECIFIED TYPE: ICD-10-CM

## 2025-07-28 DIAGNOSIS — J44.9 CHRONIC OBSTRUCTIVE PULMONARY DISEASE, UNSPECIFIED COPD TYPE: Primary | ICD-10-CM

## 2025-07-28 DIAGNOSIS — F17.201 TOBACCO ABUSE, IN REMISSION: ICD-10-CM

## 2025-07-28 DIAGNOSIS — Z86.711 HISTORY OF PULMONARY EMBOLISM: ICD-10-CM

## 2025-07-28 PROCEDURE — 1159F MED LIST DOCD IN RCRD: CPT

## 2025-07-28 PROCEDURE — 99214 OFFICE O/P EST MOD 30 MIN: CPT

## 2025-07-28 PROCEDURE — 1160F RVW MEDS BY RX/DR IN RCRD: CPT

## 2025-07-28 RX ORDER — DOXYCYCLINE 100 MG/1
1 CAPSULE ORAL DAILY
COMMUNITY
Start: 2025-07-16 | End: 2025-07-28

## 2025-07-28 RX ORDER — GUAIFENESIN, DEXTROMETHORPHAN HBR 60; 1200 MG/1; MG/1
1 TABLET ORAL EVERY 12 HOURS SCHEDULED
COMMUNITY
Start: 2025-07-08 | End: 2025-07-28

## 2025-07-28 RX ORDER — KETOROLAC TROMETHAMINE 4 MG/ML
SOLUTION/ DROPS OPHTHALMIC
COMMUNITY
Start: 2025-06-26

## 2025-07-28 RX ORDER — FLUCONAZOLE 200 MG/1
1 TABLET ORAL DAILY
COMMUNITY
Start: 2025-07-05 | End: 2025-07-28

## 2025-07-28 RX ORDER — BACLOFEN 10 MG/1
10 TABLET ORAL 3 TIMES DAILY PRN
COMMUNITY
Start: 2025-06-07

## 2025-07-28 RX ORDER — KETOROLAC TROMETHAMINE 5 MG/ML
SOLUTION OPHTHALMIC
COMMUNITY
Start: 2025-06-30

## 2025-07-28 RX ORDER — BUDESONIDE 0.5 MG/2ML
0.5 INHALANT ORAL
Qty: 120 ML | Refills: 5 | Status: SHIPPED | OUTPATIENT
Start: 2025-07-28

## 2025-07-28 RX ORDER — REVEFENACIN 175 UG/3ML
175 SOLUTION RESPIRATORY (INHALATION)
Qty: 90 ML | Refills: 5 | Status: SHIPPED | OUTPATIENT
Start: 2025-07-28 | End: 2025-08-01 | Stop reason: CLARIF

## 2025-07-28 RX ORDER — SUCRALFATE 1 G/1
TABLET ORAL
COMMUNITY
Start: 2025-07-15

## 2025-07-28 RX ORDER — IPRATROPIUM BROMIDE 17 UG/1
2 AEROSOL, METERED RESPIRATORY (INHALATION) 4 TIMES DAILY
COMMUNITY
Start: 2025-06-10 | End: 2025-07-28

## 2025-07-29 RX ORDER — LEVALBUTEROL INHALATION SOLUTION 1.25 MG/3ML
1 SOLUTION RESPIRATORY (INHALATION) EVERY 4 HOURS PRN
Qty: 180 ML | Refills: 3 | Status: SHIPPED | OUTPATIENT
Start: 2025-07-29

## 2025-07-30 DIAGNOSIS — J90 PLEURAL EFFUSION: ICD-10-CM

## 2025-07-30 DIAGNOSIS — I26.99 PULMONARY EMBOLISM, UNSPECIFIED CHRONICITY, UNSPECIFIED PULMONARY EMBOLISM TYPE, UNSPECIFIED WHETHER ACUTE COR PULMONALE PRESENT: Primary | ICD-10-CM

## 2025-07-31 DIAGNOSIS — B37.0 THRUSH: Primary | ICD-10-CM

## 2025-07-31 RX ORDER — CLOTRIMAZOLE 10 MG/1
10 LOZENGE ORAL 4 TIMES DAILY
Qty: 120 TABLET | Refills: 0 | Status: SHIPPED | OUTPATIENT
Start: 2025-07-31

## 2025-08-01 ENCOUNTER — TELEPHONE (OUTPATIENT)
Dept: PULMONOLOGY | Facility: CLINIC | Age: 60
End: 2025-08-01
Payer: COMMERCIAL

## 2025-08-01 DIAGNOSIS — J44.9 CHRONIC OBSTRUCTIVE PULMONARY DISEASE, UNSPECIFIED COPD TYPE: Primary | ICD-10-CM

## 2025-08-01 RX ORDER — TIOTROPIUM BROMIDE INHALATION SPRAY 3.12 UG/1
2 SPRAY, METERED RESPIRATORY (INHALATION)
Qty: 2 EACH | Refills: 0 | Status: SHIPPED | OUTPATIENT
Start: 2025-08-01 | End: 2025-08-02

## 2025-08-01 NOTE — TELEPHONE ENCOUNTER
Jose from [employer] stated the Yupelri may be approved by [employer]. Patient will need to try Spiriva for 1 month. Patient to call office after 1 month if spiriva does not work

## 2025-08-01 NOTE — TELEPHONE ENCOUNTER
Spoke with patient. Patient stated her local pharmacy was to expensive for yupelri. Sent to [facility], not covered due to not having 2 insurances. Patient okay to try Spirivia instead.

## 2025-08-06 RX ORDER — REVEFENACIN 175 UG/3ML
175 SOLUTION RESPIRATORY (INHALATION)
Qty: 90 ML | Refills: 0 | COMMUNITY
Start: 2025-08-06 | End: 2025-08-07

## 2025-08-25 DIAGNOSIS — J44.9 CHRONIC OBSTRUCTIVE PULMONARY DISEASE, UNSPECIFIED COPD TYPE: ICD-10-CM

## 2025-08-25 RX ORDER — TIOTROPIUM BROMIDE INHALATION SPRAY 3.12 UG/1
SPRAY, METERED RESPIRATORY (INHALATION)
Qty: 4 G | Refills: 3 | Status: SHIPPED | OUTPATIENT
Start: 2025-08-25 | End: 2025-08-29

## 2025-08-29 ENCOUNTER — TELEPHONE (OUTPATIENT)
Dept: PULMONOLOGY | Facility: CLINIC | Age: 60
End: 2025-08-29
Payer: COMMERCIAL

## 2025-08-29 RX ORDER — REVEFENACIN 175 UG/3ML
175 SOLUTION RESPIRATORY (INHALATION)
Qty: 90 ML | Refills: 11
Start: 2025-08-29